# Patient Record
Sex: MALE | Race: WHITE | NOT HISPANIC OR LATINO | URBAN - METROPOLITAN AREA
[De-identification: names, ages, dates, MRNs, and addresses within clinical notes are randomized per-mention and may not be internally consistent; named-entity substitution may affect disease eponyms.]

---

## 2017-06-22 ENCOUNTER — OUTPATIENT (OUTPATIENT)
Dept: OUTPATIENT SERVICES | Facility: HOSPITAL | Age: 82
LOS: 1 days | Discharge: HOME | End: 2017-06-22

## 2017-06-22 DIAGNOSIS — E11.9 TYPE 2 DIABETES MELLITUS WITHOUT COMPLICATIONS: ICD-10-CM

## 2017-06-28 DIAGNOSIS — I10 ESSENTIAL (PRIMARY) HYPERTENSION: ICD-10-CM

## 2017-06-28 DIAGNOSIS — E78.00 PURE HYPERCHOLESTEROLEMIA, UNSPECIFIED: ICD-10-CM

## 2017-06-28 DIAGNOSIS — Z00.00 ENCOUNTER FOR GENERAL ADULT MEDICAL EXAMINATION WITHOUT ABNORMAL FINDINGS: ICD-10-CM

## 2018-11-02 ENCOUNTER — OUTPATIENT (OUTPATIENT)
Dept: OUTPATIENT SERVICES | Facility: HOSPITAL | Age: 83
LOS: 1 days | Discharge: HOME | End: 2018-11-02

## 2018-11-02 DIAGNOSIS — E78.00 PURE HYPERCHOLESTEROLEMIA, UNSPECIFIED: ICD-10-CM

## 2018-11-02 DIAGNOSIS — I10 ESSENTIAL (PRIMARY) HYPERTENSION: ICD-10-CM

## 2018-11-02 DIAGNOSIS — Z00.00 ENCOUNTER FOR GENERAL ADULT MEDICAL EXAMINATION WITHOUT ABNORMAL FINDINGS: ICD-10-CM

## 2019-02-27 ENCOUNTER — OUTPATIENT (OUTPATIENT)
Dept: OUTPATIENT SERVICES | Facility: HOSPITAL | Age: 84
LOS: 1 days | Discharge: HOME | End: 2019-02-27

## 2019-02-27 DIAGNOSIS — F03.90 UNSPECIFIED DEMENTIA WITHOUT BEHAVIORAL DISTURBANCE: ICD-10-CM

## 2019-02-27 DIAGNOSIS — I10 ESSENTIAL (PRIMARY) HYPERTENSION: ICD-10-CM

## 2019-02-27 DIAGNOSIS — Z00.00 ENCOUNTER FOR GENERAL ADULT MEDICAL EXAMINATION WITHOUT ABNORMAL FINDINGS: ICD-10-CM

## 2019-02-27 DIAGNOSIS — E78.00 PURE HYPERCHOLESTEROLEMIA, UNSPECIFIED: ICD-10-CM

## 2019-08-07 ENCOUNTER — OUTPATIENT (OUTPATIENT)
Dept: OUTPATIENT SERVICES | Facility: HOSPITAL | Age: 84
LOS: 1 days | Discharge: HOME | End: 2019-08-07

## 2019-08-08 DIAGNOSIS — R79.9 ABNORMAL FINDING OF BLOOD CHEMISTRY, UNSPECIFIED: ICD-10-CM

## 2019-08-09 ENCOUNTER — OUTPATIENT (OUTPATIENT)
Dept: OUTPATIENT SERVICES | Facility: HOSPITAL | Age: 84
LOS: 1 days | Discharge: HOME | End: 2019-08-09

## 2019-08-09 DIAGNOSIS — D64.9 ANEMIA, UNSPECIFIED: ICD-10-CM

## 2019-08-21 ENCOUNTER — OUTPATIENT (OUTPATIENT)
Dept: OUTPATIENT SERVICES | Facility: HOSPITAL | Age: 84
LOS: 1 days | Discharge: HOME | End: 2019-08-21

## 2019-08-21 DIAGNOSIS — R79.9 ABNORMAL FINDING OF BLOOD CHEMISTRY, UNSPECIFIED: ICD-10-CM

## 2019-10-21 ENCOUNTER — OUTPATIENT (OUTPATIENT)
Dept: OUTPATIENT SERVICES | Facility: HOSPITAL | Age: 84
LOS: 1 days | Discharge: HOME | End: 2019-10-21

## 2019-10-21 DIAGNOSIS — K76.9 LIVER DISEASE, UNSPECIFIED: ICD-10-CM

## 2019-10-25 ENCOUNTER — OUTPATIENT (OUTPATIENT)
Dept: OUTPATIENT SERVICES | Facility: HOSPITAL | Age: 84
LOS: 1 days | Discharge: HOME | End: 2019-10-25

## 2019-10-25 DIAGNOSIS — E11.9 TYPE 2 DIABETES MELLITUS WITHOUT COMPLICATIONS: ICD-10-CM

## 2019-10-25 DIAGNOSIS — R94.6 ABNORMAL RESULTS OF THYROID FUNCTION STUDIES: ICD-10-CM

## 2020-06-08 ENCOUNTER — INPATIENT (INPATIENT)
Facility: HOSPITAL | Age: 85
LOS: 5 days | Discharge: NURSING, REHAB OR LONG-TERM CARE | End: 2020-06-14
Attending: INTERNAL MEDICINE | Admitting: INTERNAL MEDICINE
Payer: MEDICARE

## 2020-06-08 VITALS
DIASTOLIC BLOOD PRESSURE: 77 MMHG | HEART RATE: 86 BPM | RESPIRATION RATE: 18 BRPM | TEMPERATURE: 97 F | OXYGEN SATURATION: 98 % | SYSTOLIC BLOOD PRESSURE: 121 MMHG

## 2020-06-08 LAB
ALBUMIN SERPL ELPH-MCNC: 4.4 G/DL — SIGNIFICANT CHANGE UP (ref 3.5–5.2)
ALP SERPL-CCNC: 132 U/L — HIGH (ref 30–115)
ALT FLD-CCNC: 19 U/L — SIGNIFICANT CHANGE UP (ref 0–41)
ANION GAP SERPL CALC-SCNC: 14 MMOL/L — SIGNIFICANT CHANGE UP (ref 7–14)
APTT BLD: 26.5 SEC — LOW (ref 27–39.2)
AST SERPL-CCNC: 27 U/L — SIGNIFICANT CHANGE UP (ref 0–41)
BASOPHILS # BLD AUTO: 0.03 K/UL — SIGNIFICANT CHANGE UP (ref 0–0.2)
BASOPHILS NFR BLD AUTO: 0.4 % — SIGNIFICANT CHANGE UP (ref 0–1)
BILIRUB SERPL-MCNC: <0.2 MG/DL — SIGNIFICANT CHANGE UP (ref 0.2–1.2)
BLD GP AB SCN SERPL QL: SIGNIFICANT CHANGE UP
BUN SERPL-MCNC: 33 MG/DL — HIGH (ref 10–20)
CALCIUM SERPL-MCNC: 9.7 MG/DL — SIGNIFICANT CHANGE UP (ref 8.5–10.1)
CHLORIDE SERPL-SCNC: 103 MMOL/L — SIGNIFICANT CHANGE UP (ref 98–110)
CO2 SERPL-SCNC: 23 MMOL/L — SIGNIFICANT CHANGE UP (ref 17–32)
CREAT SERPL-MCNC: 1.3 MG/DL — SIGNIFICANT CHANGE UP (ref 0.7–1.5)
EOSINOPHIL # BLD AUTO: 0.11 K/UL — SIGNIFICANT CHANGE UP (ref 0–0.7)
EOSINOPHIL NFR BLD AUTO: 1.6 % — SIGNIFICANT CHANGE UP (ref 0–8)
GLUCOSE SERPL-MCNC: 153 MG/DL — HIGH (ref 70–99)
HCT VFR BLD CALC: 38.2 % — LOW (ref 42–52)
HGB BLD-MCNC: 11.5 G/DL — LOW (ref 14–18)
IMM GRANULOCYTES NFR BLD AUTO: 0.4 % — HIGH (ref 0.1–0.3)
INR BLD: 0.93 RATIO — SIGNIFICANT CHANGE UP (ref 0.65–1.3)
LYMPHOCYTES # BLD AUTO: 1.58 K/UL — SIGNIFICANT CHANGE UP (ref 1.2–3.4)
LYMPHOCYTES # BLD AUTO: 22.7 % — SIGNIFICANT CHANGE UP (ref 20.5–51.1)
MCHC RBC-ENTMCNC: 21.1 PG — LOW (ref 27–31)
MCHC RBC-ENTMCNC: 30.1 G/DL — LOW (ref 32–37)
MCV RBC AUTO: 70 FL — LOW (ref 80–94)
MONOCYTES # BLD AUTO: 0.49 K/UL — SIGNIFICANT CHANGE UP (ref 0.1–0.6)
MONOCYTES NFR BLD AUTO: 7 % — SIGNIFICANT CHANGE UP (ref 1.7–9.3)
NEUTROPHILS # BLD AUTO: 4.72 K/UL — SIGNIFICANT CHANGE UP (ref 1.4–6.5)
NEUTROPHILS NFR BLD AUTO: 67.9 % — SIGNIFICANT CHANGE UP (ref 42.2–75.2)
NRBC # BLD: 0 /100 WBCS — SIGNIFICANT CHANGE UP (ref 0–0)
PLATELET # BLD AUTO: 302 K/UL — SIGNIFICANT CHANGE UP (ref 130–400)
POTASSIUM SERPL-MCNC: 6.1 MMOL/L — CRITICAL HIGH (ref 3.5–5)
POTASSIUM SERPL-SCNC: 6.1 MMOL/L — CRITICAL HIGH (ref 3.5–5)
PROT SERPL-MCNC: 7.2 G/DL — SIGNIFICANT CHANGE UP (ref 6–8)
PROTHROM AB SERPL-ACNC: 10.7 SEC — SIGNIFICANT CHANGE UP (ref 9.95–12.87)
RBC # BLD: 5.46 M/UL — SIGNIFICANT CHANGE UP (ref 4.7–6.1)
RBC # FLD: 20.9 % — HIGH (ref 11.5–14.5)
SODIUM SERPL-SCNC: 140 MMOL/L — SIGNIFICANT CHANGE UP (ref 135–146)
WBC # BLD: 6.96 K/UL — SIGNIFICANT CHANGE UP (ref 4.8–10.8)
WBC # FLD AUTO: 6.96 K/UL — SIGNIFICANT CHANGE UP (ref 4.8–10.8)

## 2020-06-08 PROCEDURE — 99285 EMERGENCY DEPT VISIT HI MDM: CPT

## 2020-06-08 PROCEDURE — 73502 X-RAY EXAM HIP UNI 2-3 VIEWS: CPT | Mod: 26,RT

## 2020-06-08 PROCEDURE — 70450 CT HEAD/BRAIN W/O DYE: CPT | Mod: 26

## 2020-06-08 PROCEDURE — 99222 1ST HOSP IP/OBS MODERATE 55: CPT | Mod: AI

## 2020-06-08 PROCEDURE — 72125 CT NECK SPINE W/O DYE: CPT | Mod: 26

## 2020-06-08 PROCEDURE — 93010 ELECTROCARDIOGRAM REPORT: CPT

## 2020-06-08 PROCEDURE — 71045 X-RAY EXAM CHEST 1 VIEW: CPT | Mod: 26

## 2020-06-08 RX ORDER — LISINOPRIL 2.5 MG/1
1 TABLET ORAL
Qty: 0 | Refills: 0 | DISCHARGE

## 2020-06-08 RX ORDER — CITALOPRAM 10 MG/1
20 TABLET, FILM COATED ORAL DAILY
Refills: 0 | Status: DISCONTINUED | OUTPATIENT
Start: 2020-06-08 | End: 2020-06-09

## 2020-06-08 RX ORDER — DONEPEZIL HYDROCHLORIDE 10 MG/1
1 TABLET, FILM COATED ORAL
Qty: 0 | Refills: 0 | DISCHARGE

## 2020-06-08 RX ORDER — LEVOTHYROXINE SODIUM 125 MCG
50 TABLET ORAL DAILY
Refills: 0 | Status: DISCONTINUED | OUTPATIENT
Start: 2020-06-08 | End: 2020-06-09

## 2020-06-08 RX ORDER — ATORVASTATIN CALCIUM 80 MG/1
1 TABLET, FILM COATED ORAL
Qty: 0 | Refills: 0 | DISCHARGE

## 2020-06-08 RX ORDER — LEVOTHYROXINE SODIUM 125 MCG
1 TABLET ORAL
Qty: 0 | Refills: 0 | DISCHARGE

## 2020-06-08 RX ORDER — DONEPEZIL HYDROCHLORIDE 10 MG/1
10 TABLET, FILM COATED ORAL AT BEDTIME
Refills: 0 | Status: DISCONTINUED | OUTPATIENT
Start: 2020-06-08 | End: 2020-06-09

## 2020-06-08 RX ORDER — CITALOPRAM 10 MG/1
1 TABLET, FILM COATED ORAL
Qty: 0 | Refills: 0 | DISCHARGE

## 2020-06-08 RX ORDER — ACETAMINOPHEN 500 MG
650 TABLET ORAL ONCE
Refills: 0 | Status: COMPLETED | OUTPATIENT
Start: 2020-06-08 | End: 2020-06-08

## 2020-06-08 RX ORDER — HEPARIN SODIUM 5000 [USP'U]/ML
5000 INJECTION INTRAVENOUS; SUBCUTANEOUS EVERY 8 HOURS
Refills: 0 | Status: DISCONTINUED | OUTPATIENT
Start: 2020-06-08 | End: 2020-06-09

## 2020-06-08 RX ORDER — SODIUM CHLORIDE 9 MG/ML
1000 INJECTION, SOLUTION INTRAVENOUS
Refills: 0 | Status: DISCONTINUED | OUTPATIENT
Start: 2020-06-08 | End: 2020-06-09

## 2020-06-08 RX ORDER — MORPHINE SULFATE 50 MG/1
2 CAPSULE, EXTENDED RELEASE ORAL EVERY 4 HOURS
Refills: 0 | Status: DISCONTINUED | OUTPATIENT
Start: 2020-06-08 | End: 2020-06-09

## 2020-06-08 RX ORDER — CLONAZEPAM 1 MG
0.5 TABLET ORAL AT BEDTIME
Refills: 0 | Status: DISCONTINUED | OUTPATIENT
Start: 2020-06-08 | End: 2020-06-09

## 2020-06-08 RX ORDER — MORPHINE SULFATE 50 MG/1
2 CAPSULE, EXTENDED RELEASE ORAL ONCE
Refills: 0 | Status: DISCONTINUED | OUTPATIENT
Start: 2020-06-08 | End: 2020-06-08

## 2020-06-08 RX ORDER — MIDAZOLAM HYDROCHLORIDE 1 MG/ML
2 INJECTION, SOLUTION INTRAMUSCULAR; INTRAVENOUS ONCE
Refills: 0 | Status: DISCONTINUED | OUTPATIENT
Start: 2020-06-08 | End: 2020-06-08

## 2020-06-08 RX ORDER — ATORVASTATIN CALCIUM 80 MG/1
40 TABLET, FILM COATED ORAL DAILY
Refills: 0 | Status: DISCONTINUED | OUTPATIENT
Start: 2020-06-08 | End: 2020-06-09

## 2020-06-08 RX ADMIN — MORPHINE SULFATE 2 MILLIGRAM(S): 50 CAPSULE, EXTENDED RELEASE ORAL at 18:29

## 2020-06-08 RX ADMIN — MIDAZOLAM HYDROCHLORIDE 2 MILLIGRAM(S): 1 INJECTION, SOLUTION INTRAMUSCULAR; INTRAVENOUS at 20:09

## 2020-06-08 NOTE — ED ADULT NURSE NOTE - OBJECTIVE STATEMENT
Pt presented to the ED s/p fall from [facility]. Pt does not recall event and is poor historian.

## 2020-06-08 NOTE — H&P ADULT - NSHPLABSRESULTS_GEN_ALL_CORE
CBC Full  -  ( 08 Jun 2020 18:21 )  WBC Count : 6.96 K/uL  RBC Count : 5.46 M/uL  Hemoglobin : 11.5 g/dL  Hematocrit : 38.2 %  Platelet Count - Automated : 302 K/uL  Mean Cell Volume : 70.0 fL  Mean Cell Hemoglobin : 21.1 pg  Mean Cell Hemoglobin Concentration : 30.1 g/dL  Auto Neutrophil # : 4.72 K/uL  Auto Lymphocyte # : 1.58 K/uL  Auto Monocyte # : 0.49 K/uL  Auto Eosinophil # : 0.11 K/uL  Auto Basophil # : 0.03 K/uL  Auto Neutrophil % : 67.9 %  Auto Lymphocyte % : 22.7 %  Auto Monocyte % : 7.0 %  Auto Eosinophil % : 1.6 %  Auto Basophil % : 0.4 %    06-08    140  |  103  |  33<H>  ----------------------------<  153<H>  6.1<HH>   |  23  |  1.3    Ca    9.7      08 Jun 2020 18:21    TPro  7.2  /  Alb  4.4  /  TBili  <0.2  /  DBili  x   /  AST  27  /  ALT  19  /  AlkPhos  132<H>  06-08

## 2020-06-08 NOTE — ED PROVIDER NOTE - CLINICAL SUMMARY MEDICAL DECISION MAKING FREE TEXT BOX
Elderly male normally ambulatory presents to ER for fall in NH today. Landed on right side. Has +right hip fx, intertrochanteric, mild displacement. Neuro-vascular intact. No other injury. Admitted to medicine, with ortho consult. Labs, ekg, xrays and CT scans reviewed

## 2020-06-08 NOTE — ED PROVIDER NOTE - ATTENDING CONTRIBUTION TO CARE
85 yo male with PMH of anemia, sepsis, CKD, HTN, HLD, DM, alzheimer's dementia, bilateral cataract, hypothyroidism, aggressive behavior presents to ER from [facility] for a fall. Per EMS, pt found down in room by staff, landing on right side and complained about pain the right hip. Pt in ER states he fell but feels fine. Denies any other complaints. Pt on exam is in NAD, vitals stable, NCAT, EOMI, PERRL, no c-spine tenderness, no spinal tenderness, no rib tenderness Lungs CTAB, heart regular s1s2, Abdomen soft nt/nd +BS, no masses, no bruising, Ext mild shortening of RLE, +right hip tenderness to palpation, no tenderness or joint deformity elsewhere, distal pulses intact, Neuro alert, oriented to name at present, no slurred speech, no motor or sensory deficit. Check xrays, labs, ekg, CT scans, and if +fx, to admit.     ALL: nkda  PMH as above  Meds: atorvastatin, citalopram, clonazepam, donepezil, lisinopril, synthroid, tylenol  PMD Pelora/Seminara

## 2020-06-08 NOTE — ED ADULT NURSE NOTE - NSFALLRSKASSESSTYPE_ED_ALL_ED
[FreeTextEntry6] : PROCEDURE NOTES\par \par PROCEDURE: Nasal endoscopy \par SURGEON: Dr. Rollins\par INDICATIONS: Assess for chronic sinusitis. \par ANESTHESIA: The patient was placed in a sitting position.  Following application of the topical anesthetic and decongestant, exam was performed with a zero degree endoscope.  The scope was passed along the right nasal floor to the nasopharynx.  It was then passed into the region of the middle meatus, middle turbinate, and sphenoethmoid region.  An identical procedure was performed on the left side.  The following findings were noted:\par \par The nasal mucosa was healthy appearing and the septum was roughly midline. The middle meatus and sphenoethmoid recesses were clear bilaterally. The nasopharynx was normal. \par  
Initial (On Arrival)

## 2020-06-08 NOTE — H&P ADULT - ASSESSMENT
pt is a 86 years old m with PMH  of advanced  alzheimer , mood disorder , HTN , presented to ED  after  being found at nursing home on Right  side on ground this evening. Circumstances of fall are unclear per NH , no downtime known. Pt known to be ambulatory at baseline. Pt only complaining of pain to Right  hip pain. On presentation to ED pt found to have right intertrochanteric fracture . Pt evaluated by orthopedics . Pt will go for ORIF tomorrow .    #) Right Intertrochanteric fracture  - admit to medicine  - NWB right leg  - pain control  - IVF for now  - Keep pt NPO past midnight  - Hold 2 units of PRBC  - Pt hyperkalemic on lab but hemolyzed will repeat  - COVID swab  - Medical Clearance    #) Advanced Alzheimers  - continue donepezil , clonazepam and citalopram    #) HTN  - continue lisinopril    #)Hypothyroidism  - continue synthyroid      #) Diet NPO past midnight , IVF     #) DVT PPX heparin sq    Dispo Acute

## 2020-06-08 NOTE — H&P ADULT - ATTENDING COMMENTS
HPI:  pt is a 86 years old m with PMH  of advanced  alzheimer , mood disorder , HTN , presented to ED  after  being found at nursing home on Right  side on ground this evening. Circumstances of fall are unclear per NH , no downtime known. Pt known to be ambulatory at baseline. Pt only complaining of pain to Right  hip pain. On presentation to ED pt found to have right intertrochanteric fracture . Pt evaluated by orthopedics . Pt will go for ORIF tomorrow . (08 Jun 2020 22:19)    REVIEW OF SYSTEMS: see cc/HPI   CONSTITUTIONAL: No weakness, fevers or chills  EYES/ENT: No visual changes;  No vertigo or throat pain   NECK: No pain or stiffness  RESPIRATORY: No cough, wheezing, hemoptysis; No shortness of breath  CARDIOVASCULAR: No chest pain or palpitations  GASTROINTESTINAL: No abdominal or epigastric pain. No nausea, vomiting, or hematemesis; No diarrhea or constipation. No melena or hematochezia.  GENITOURINARY: No dysuria, frequency or hematuria  NEUROLOGICAL: No numbness or weakness  SKIN: No itching, rashes    Unable to gauge functional status in patient     < from: 12 Lead ECG (06.08.20 @ 19:56) >  Ventricular Rate 92 BPM  Atrial Rate 92 BPM  P-R Interval 144 ms  QRS Duration 84 ms  Q-T Interval 356 ms  QTC Calculation(Bezet) 440 ms  P Axis 79 degrees  R Axis -36 degrees  T Axis 63 degrees  Diagnosis Line Sinus rhythm with occasional Premature ventricular complexes  Left axis deviation  Left anterior fascicular block  Abnormal ECG  Confirmed by FADI DAVEY MD ([id number]) on 6/8/2020 10:52:39 PM  < end of copied text >        Physical Exam:  General: WN/WD NAD, w/o complaint   Neurology: A&Ox3, nonfocal, follows commands  Eyes: PERRLA/ EOMI  ENT/Neck: Neck supple, trachea midline, No JVD  Respiratory: CTA B/L, No wheezing, rales, rhonchi  CV: Normal rate regular rhythm, S1S2, no murmurs, rubs or gallops  Abdominal: Soft, NT, ND +BS,   Extremities: No edema, + peripheral pulses, R LE externally rotated and shortened   Skin: No Rashes, Hematoma, Ecchymosis  Incisions:   Tubes:    A/p   R hip fracture in patient w/ limited mobility and dementia at baseline  -NPO IV fluids after MN  -PRN Pain Rx  -Repeat BMP ( need to check K+) prior to OR   -f/U COVID-19 PCR prior to OR   -Ortho follow up   -will confirm w/ family surgical plan and answer questions     Dementia - Alzheimer's disease   -c/w current Rx    HTN -stable    DVT prophylaxis

## 2020-06-08 NOTE — ED PROVIDER NOTE - OBJECTIVE STATEMENT
pt is a 86 yom w/ jarrett here for being found at nursing home on R side on ground this evening. pt known to be ambulatory at baseline. Pt only complaining of pain to R hip.

## 2020-06-08 NOTE — ED PROVIDER NOTE - PROGRESS NOTE DETAILS
Franck: called [facility] , and spoke to ERAN Carroll. Discussed case and need for a consult Franck--spoke to Nurse supervisor (shayy) at [facility], informed her we will admit patient to hospital given acute hip fracture. She also gave me the daughters contact number which is Jacqueline Lisa [phone]. CP: spoke with daughter, informed her of pt status and admission. Informed daughter that the Ortho service will be calling her with patient's options.

## 2020-06-08 NOTE — CONSULT NOTE ADULT - SUBJECTIVE AND OBJECTIVE BOX
86 y o M transferred from [facility] to hospital with right hip IT fx s/p fall today. Presents with right hip pain.    PAST MEDICAL & SURGICAL HISTORY:  CKD  HTN  DLD  DM        Allergies    No Known Allergies    Intolerances    Pt s/e in ER    NAD    Vital Signs Last 24 Hrs  T(C): 37.1 (08 Jun 2020 19:41), Max: 37.1 (08 Jun 2020 19:41)  T(F): 98.7 (08 Jun 2020 19:41), Max: 98.7 (08 Jun 2020 19:41)  HR: 94 (08 Jun 2020 19:41) (86 - 94)  BP: 115/52 (08 Jun 2020 19:41) (115/52 - 121/77)  BP(mean): --  RR: 18 (08 Jun 2020 19:41) (18 - 18)  SpO2: 97% (08 Jun 2020 19:41) (97% - 98%)    PE: RLE shortened, externally rotated, right groin ttp, pain on leg rolling, ankle/toes- motor function intact, sensation grossly intact, foot wwp, palpable pulses                          11.5   6.96  )-----------( 302      ( 08 Jun 2020 18:21 )             38.2     06-08    140  |  103  |  33<H>  ----------------------------<  153<H>  6.1<HH>   |  23  |  1.3    Ca    9.7      08 Jun 2020 18:21    TPro  7.2  /  Alb  4.4  /  TBili  <0.2  /  DBili  x   /  AST  27  /  ALT  19  /  AlkPhos  132<H>  06-08      PT/INR - ( 08 Jun 2020 19:16 )   PT: 10.70 sec;   INR: 0.93 ratio         PTT - ( 08 Jun 2020 19:16 )  PTT:26.5 sec      < from: Xray Hip 2-3 Views, Right (06.08.20 @ 18:50) >  Findings/impression    Right hip intertrochanteric fracture, acute.    < end of copied text >

## 2020-06-08 NOTE — CONSULT NOTE ADULT - ATTENDING COMMENTS
Pt. seen/ examined  Oriented X1, self  Not aware of fracture  No additional injuries noted  RLE NVID  Will obtain consent from family  Planning R femur IMN once medically optimized/ cleared/ OR available

## 2020-06-08 NOTE — CONSULT NOTE ADULT - ASSESSMENT
86 y o M with right intertrochanteric fx s/p fall    -medicine evaluation/optimization for possible surgery tomorrow- right hip ORIF, on add on schedule  - NPO after MN  - bedrest, NWB RLE  - pain control  - DVT ppx  - repeat labs in am  -2 units PRBC on hold for OR  - w/d with dr Diez

## 2020-06-08 NOTE — ED ADULT NURSE NOTE - NSIMPLEMENTINTERV_GEN_ALL_ED
Implemented All Fall with Harm Risk Interventions:  [place] to call system. Call bell, personal items and telephone within reach. Instruct patient to call for assistance. Room bathroom lighting operational. Non-slip footwear when patient is off stretcher. Physically safe environment: no spills, clutter or unnecessary equipment. Stretcher in lowest position, wheels locked, appropriate side rails in place. Provide visual cue, wrist band, yellow gown, etc. Monitor gait and stability. Monitor for mental status changes and reorient to person, place, and time. Review medications for side effects contributing to fall risk. Reinforce activity limits and safety measures with patient and family. Provide visual clues: red socks.

## 2020-06-08 NOTE — H&P ADULT - NSHPPHYSICALEXAM_GEN_ALL_CORE
PHYSICAL EXAM:  GENERAL: NAD, well-developed  HEAD:  Atraumatic, Normocephalic  EYES: EOMI, PERRLA, conjunctiva and sclera clear  NECK: Supple, No JVD  CHEST/LUNG: Clear to auscultation bilaterally; No wheeze  HEART: Regular rate and rhythm; No murmurs, rubs, or gallops  ABDOMEN: Soft, Nontender, Nondistended; Bowel sounds present  EXTREMITIES:  Right hip pain

## 2020-06-08 NOTE — ED PROVIDER NOTE - PHYSICAL EXAMINATION
Constitutional: Well developed, well nourished. NAD  TRAUMA: ABC intact. GCS 15.   Head: Normocephalic, atraumatic.  Eyes: PERRL. EOMI. No Raccoon eyes.   ENT: No nasal discharge. No septal hematoma. No Aguilar sign. Mucous membranes moist.  Neck: Supple. Painless ROM. No midline tenderness, stepoffs.  Cardiovascular: Normal S1, S2. Regular rate and rhythm. No murmurs, rubs, or gallops.  Pulmonary: Normal respiratory rate and effort. Lungs clear to auscultation bilaterally. No wheezing, rales, or rhonchi.  CHEST: No chest wall tenderness, crepitus.  Abdominal: Soft. Nondistended. Nontender. No rebound, guarding, rigidity.  BACK: No midline T/L/S tenderness, stepoffs. No saddle paresthesia.  Extremities. Pelvis stable. No traumatic deformities. +tenderness to R hip  Skin: No rashes, cyanosis, lacerations, abrasions.  Neuro: AAOx3. Strength 5/5 in all extremities. Sensation intact throughout. No focal neurological deficits.  Psych: Normal mood. Normal affect.

## 2020-06-08 NOTE — H&P ADULT - HISTORY OF PRESENT ILLNESS
pt is a 86 years old m with PMH  of advanced  alzheimer , mood disorder , HTN , presented to ED  after  being found at nursing home on Right  side on ground this evening. Circumstances of fall are unclear per NH , no downtime known. Pt known to be ambulatory at baseline. Pt only complaining of pain to Right  hip pain. On presentation to ED pt found to have right intertrochanteric fracture . Pt evaluated by orthopedics . Pt will go for ORIF tomorrow .

## 2020-06-09 LAB
ABO RH CONFIRMATION: SIGNIFICANT CHANGE UP
ANION GAP SERPL CALC-SCNC: 11 MMOL/L — SIGNIFICANT CHANGE UP (ref 7–14)
ANION GAP SERPL CALC-SCNC: 12 MMOL/L — SIGNIFICANT CHANGE UP (ref 7–14)
BUN SERPL-MCNC: 33 MG/DL — HIGH (ref 10–20)
BUN SERPL-MCNC: 35 MG/DL — HIGH (ref 10–20)
CALCIUM SERPL-MCNC: 9.2 MG/DL — SIGNIFICANT CHANGE UP (ref 8.5–10.1)
CALCIUM SERPL-MCNC: 9.3 MG/DL — SIGNIFICANT CHANGE UP (ref 8.5–10.1)
CHLORIDE SERPL-SCNC: 104 MMOL/L — SIGNIFICANT CHANGE UP (ref 98–110)
CHLORIDE SERPL-SCNC: 105 MMOL/L — SIGNIFICANT CHANGE UP (ref 98–110)
CK SERPL-CCNC: 73 U/L — SIGNIFICANT CHANGE UP (ref 0–225)
CO2 SERPL-SCNC: 22 MMOL/L — SIGNIFICANT CHANGE UP (ref 17–32)
CO2 SERPL-SCNC: 24 MMOL/L — SIGNIFICANT CHANGE UP (ref 17–32)
CREAT SERPL-MCNC: 1.3 MG/DL — SIGNIFICANT CHANGE UP (ref 0.7–1.5)
CREAT SERPL-MCNC: 1.3 MG/DL — SIGNIFICANT CHANGE UP (ref 0.7–1.5)
GLUCOSE BLDC GLUCOMTR-MCNC: 104 MG/DL — HIGH (ref 70–99)
GLUCOSE BLDC GLUCOMTR-MCNC: 109 MG/DL — HIGH (ref 70–99)
GLUCOSE BLDC GLUCOMTR-MCNC: 117 MG/DL — HIGH (ref 70–99)
GLUCOSE BLDC GLUCOMTR-MCNC: 240 MG/DL — HIGH (ref 70–99)
GLUCOSE BLDC GLUCOMTR-MCNC: 290 MG/DL — HIGH (ref 70–99)
GLUCOSE BLDC GLUCOMTR-MCNC: 62 MG/DL — LOW (ref 70–99)
GLUCOSE SERPL-MCNC: 185 MG/DL — HIGH (ref 70–99)
GLUCOSE SERPL-MCNC: 190 MG/DL — HIGH (ref 70–99)
HCT VFR BLD CALC: 29.2 % — LOW (ref 42–52)
HGB BLD-MCNC: 8.7 G/DL — LOW (ref 14–18)
MCHC RBC-ENTMCNC: 20.4 PG — LOW (ref 27–31)
MCHC RBC-ENTMCNC: 29.8 G/DL — LOW (ref 32–37)
MCV RBC AUTO: 68.5 FL — LOW (ref 80–94)
NRBC # BLD: 0 /100 WBCS — SIGNIFICANT CHANGE UP (ref 0–0)
PLATELET # BLD AUTO: 272 K/UL — SIGNIFICANT CHANGE UP (ref 130–400)
POTASSIUM SERPL-MCNC: 5 MMOL/L — SIGNIFICANT CHANGE UP (ref 3.5–5)
POTASSIUM SERPL-MCNC: 5.3 MMOL/L — HIGH (ref 3.5–5)
POTASSIUM SERPL-SCNC: 5 MMOL/L — SIGNIFICANT CHANGE UP (ref 3.5–5)
POTASSIUM SERPL-SCNC: 5.3 MMOL/L — HIGH (ref 3.5–5)
RBC # BLD: 4.26 M/UL — LOW (ref 4.7–6.1)
RBC # FLD: 19.5 % — HIGH (ref 11.5–14.5)
SARS-COV-2 RNA SPEC QL NAA+PROBE: SIGNIFICANT CHANGE UP
SODIUM SERPL-SCNC: 139 MMOL/L — SIGNIFICANT CHANGE UP (ref 135–146)
SODIUM SERPL-SCNC: 139 MMOL/L — SIGNIFICANT CHANGE UP (ref 135–146)
TROPONIN T SERPL-MCNC: <0.01 NG/ML — SIGNIFICANT CHANGE UP
WBC # BLD: 9.19 K/UL — SIGNIFICANT CHANGE UP (ref 4.8–10.8)
WBC # FLD AUTO: 9.19 K/UL — SIGNIFICANT CHANGE UP (ref 4.8–10.8)

## 2020-06-09 PROCEDURE — 99223 1ST HOSP IP/OBS HIGH 75: CPT

## 2020-06-09 PROCEDURE — 73552 X-RAY EXAM OF FEMUR 2/>: CPT | Mod: 26,RT

## 2020-06-09 RX ORDER — DEXTROSE 50 % IN WATER 50 %
15 SYRINGE (ML) INTRAVENOUS ONCE
Refills: 0 | Status: DISCONTINUED | OUTPATIENT
Start: 2020-06-09 | End: 2020-06-09

## 2020-06-09 RX ORDER — DEXTROSE 50 % IN WATER 50 %
12.5 SYRINGE (ML) INTRAVENOUS ONCE
Refills: 0 | Status: DISCONTINUED | OUTPATIENT
Start: 2020-06-09 | End: 2020-06-09

## 2020-06-09 RX ORDER — DEXTROSE 10 % IN WATER 10 %
250 INTRAVENOUS SOLUTION INTRAVENOUS ONCE
Refills: 0 | Status: COMPLETED | OUTPATIENT
Start: 2020-06-09 | End: 2020-06-09

## 2020-06-09 RX ORDER — DEXTROSE 50 % IN WATER 50 %
25 SYRINGE (ML) INTRAVENOUS ONCE
Refills: 0 | Status: DISCONTINUED | OUTPATIENT
Start: 2020-06-09 | End: 2020-06-09

## 2020-06-09 RX ORDER — SODIUM CHLORIDE 9 MG/ML
1000 INJECTION, SOLUTION INTRAVENOUS
Refills: 0 | Status: DISCONTINUED | OUTPATIENT
Start: 2020-06-09 | End: 2020-06-09

## 2020-06-09 RX ORDER — INSULIN LISPRO 100/ML
VIAL (ML) SUBCUTANEOUS
Refills: 0 | Status: DISCONTINUED | OUTPATIENT
Start: 2020-06-09 | End: 2020-06-09

## 2020-06-09 RX ORDER — GLUCAGON INJECTION, SOLUTION 0.5 MG/.1ML
1 INJECTION, SOLUTION SUBCUTANEOUS ONCE
Refills: 0 | Status: DISCONTINUED | OUTPATIENT
Start: 2020-06-09 | End: 2020-06-09

## 2020-06-09 RX ORDER — INSULIN HUMAN 100 [IU]/ML
10 INJECTION, SOLUTION SUBCUTANEOUS ONCE
Refills: 0 | Status: COMPLETED | OUTPATIENT
Start: 2020-06-09 | End: 2020-06-09

## 2020-06-09 RX ADMIN — SODIUM CHLORIDE 75 MILLILITER(S): 9 INJECTION, SOLUTION INTRAVENOUS at 00:07

## 2020-06-09 RX ADMIN — INSULIN HUMAN 10 UNIT(S): 100 INJECTION, SOLUTION SUBCUTANEOUS at 06:57

## 2020-06-09 RX ADMIN — CITALOPRAM 20 MILLIGRAM(S): 10 TABLET, FILM COATED ORAL at 12:04

## 2020-06-09 RX ADMIN — HEPARIN SODIUM 5000 UNIT(S): 5000 INJECTION INTRAVENOUS; SUBCUTANEOUS at 16:29

## 2020-06-09 RX ADMIN — MORPHINE SULFATE 2 MILLIGRAM(S): 50 CAPSULE, EXTENDED RELEASE ORAL at 03:30

## 2020-06-09 RX ADMIN — Medication 50 MICROGRAM(S): at 05:06

## 2020-06-09 RX ADMIN — Medication 750 MILLILITER(S): at 11:55

## 2020-06-09 RX ADMIN — HEPARIN SODIUM 5000 UNIT(S): 5000 INJECTION INTRAVENOUS; SUBCUTANEOUS at 05:06

## 2020-06-09 RX ADMIN — Medication 1000 MILLILITER(S): at 06:57

## 2020-06-09 NOTE — CHART NOTE - NSCHARTNOTEFT_GEN_A_CORE
PACU ANESTHESIA ADMISSION NOTE      Procedure: RIGHT HIP ORIF  Post op diagnosis:  RIGHT HIP FX    ____  Intubated  TV:______       Rate: ______      FiO2: ______    _X___  Patent Airway    __X__  Full return of protective reflexes    ____X  Full recovery from anesthesia / back to baseline status    Vitals:  T(C): 38.7   HR: 129  BP: 159/76   RR: 20  SpO2: 95%    Mental Status:  _X___ Awake   _____ Alert   _____ Drowsy   _____ Sedated    Nausea/Vomiting:  _X___ NO  ______Yes,   See Post - Op Orders          Pain Scale (0-10):  _____    Treatment: __X__ None    ____ See Post - Op/PCA Orders    Post - Operative Fluids:   ____ Oral   ___X_ See Post - Op Orders    Plan: Discharge:   ____Home       ___X__Floor     _____Critical Care    _____  Other:_________________    Comments:

## 2020-06-09 NOTE — PROGRESS NOTE ADULT - ASSESSMENT
pt is a 86 years old m with PMH  of advanced  alzheimer , mood disorder , HTN , presented to ED  after  being found at nursing home on Right  side on ground this evening. Circumstances of fall are unclear per NH , no downtime known. Pt known to be ambulatory at baseline. Pt only complaining of pain to Right  hip pain. On presentation to ED pt found to have right intertrochanteric fracture . Pt evaluated by orthopedics . Pt will go for ORIF today.    #) Right Intertrochanteric fracture pending ORIF today  - admit to medicine  - NWB right leg  - pain control  - IVF for now  - Keep pt NPO  - Hold 2 units of PRBC  - Pt hyperkalemic on lab but hemolyzed will repeat  - COVID swab f/u  - Medical Clearance    #) Advanced Alzheimers  - continue donepezil , clonazepam and citalopram    #) HTN  - continue lisinopril    #)Hypothyroidism  - continue synthyroid      #) Diet NPO, IVF     #) DVT PPX heparin sq    Dispo Acute

## 2020-06-09 NOTE — PROGRESS NOTE ADULT - SUBJECTIVE AND OBJECTIVE BOX
Pre Op: planned procedure ORIF    HPI:  pt is a 86 years old m with PMH  of advanced  alzheimer , mood disorder , HTN , presented to ED  after  being found at nursing home on Right  side on ground this evening. Circumstances of fall are unclear per NH , no downtime known. Pt known to be ambulatory at baseline. Pt only complaining of pain to Right  hip pain. On presentation to ED pt found to have right intertrochanteric fracture . Pt evaluated by orthopedics . Pt will go for ORIF today.     PAST MEDICAL & SURGICAL HISTORY:  alzhimer HTN, Mood d/o, hypothyroidism, hyperlipidemia,     allergy: NKDA    Home Medications:  atorvastatin 40 mg oral tablet: 1 tab(s) orally once a day (08 Jun 2020 22:23)  citalopram 20 mg oral tablet: 1 tab(s) orally once a day (08 Jun 2020 22:23)  clonazePAM 0.5 mg oral tablet: 1 tab(s) orally once a day (at bedtime) (08 Jun 2020 22:23)  donepezil 10 mg oral tablet: 1 tab(s) orally once a day (at bedtime) (08 Jun 2020 22:23)  lisinopril 5 mg oral tablet: 1 tab(s) orally once a day (08 Jun 2020 22:24)  Synthroid 50 mcg (0.05 mg) oral tablet: 1 tab(s) orally once a day (08 Jun 2020 22:24)      MEDICATIONS  (STANDING):  atorvastatin Oral Tab/Cap - Peds 40 milliGRAM(s) Oral daily  citalopram 20 milliGRAM(s) Oral daily  clonazePAM  Tablet 0.5 milliGRAM(s) Oral at bedtime  dextrose 10% Bolus 250 milliLiter(s) IV Bolus once  dextrose 5% + sodium chloride 0.45%. 1000 milliLiter(s) (75 mL/Hr) IV Continuous <Continuous>  dextrose 5%. 1000 milliLiter(s) (50 mL/Hr) IV Continuous <Continuous>  dextrose 50% Injectable 12.5 Gram(s) IV Push once  dextrose 50% Injectable 25 Gram(s) IV Push once  dextrose 50% Injectable 25 Gram(s) IV Push once  donepezil 10 milliGRAM(s) Oral at bedtime  heparin   Injectable 5000 Unit(s) SubCutaneous every 8 hours  insulin lispro (HumaLOG) corrective regimen sliding scale   SubCutaneous three times a day before meals  levothyroxine 50 MICROGram(s) Oral daily      pt is not in pain now.   Vital Signs Last 24 Hrs  T(C): 37.2 (09 Jun 2020 07:56), Max: 37.3 (09 Jun 2020 05:50)  T(F): 98.9 (09 Jun 2020 07:56), Max: 99.1 (09 Jun 2020 05:50)  HR: 62 (09 Jun 2020 07:56) (60 - 94)  BP: 157/67 (09 Jun 2020 07:56) (115/52 - 157/67)  RR: 18 (09 Jun 2020 07:56) (18 - 18)  SpO2: 97% (09 Jun 2020 07:56) (96% - 98%)                          8.7    9.19  )-----------( 272      ( 09 Jun 2020 05:39 )             29.2   06-09    139  |  104  |  33<H>  ----------------------------<  185<H>  5.0   |  24  |  1.3    Ca    9.2      09 Jun 2020 05:39    TPro  7.2  /  Alb  4.4  /  TBili  <0.2  /  DBili  x   /  AST  27  /  ALT  19  /  AlkPhos  132<H>  06-08    < from: 12 Lead ECG (06.08.20 @ 19:56) >  Ventricular Rate 92 BPM    Atrial Rate 92 BPM    P-R Interval 144 ms    QRS Duration 84 ms    Q-T Interval 356 ms    QTC Calculation(Bezet) 440 ms    P Axis 79 degrees    R Axis -36 degrees    T Axis 63 degrees    Diagnosis Line Sinus rhythm with occasional Premature ventricular complexes  Left axis deviation  Left anterior fascicular block  Abnormal ECG    < end of copied text >    < from: Xray Femur 2 Views, Right (06.09.20 @ 06:19) >  Acute intertrochanteric/subtrochanteric right femoral fracture    < end of copied text >    < from: Xray Chest 1 View-PORTABLE IMMEDIATE (06.08.20 @ 18:49) >  No radiographic evidence of acute cardiopulmonary disease.    < end of copied text >    a/p  #acute intratrochanteric and subtrochanteric right femoral fracuture due to mechanical fall.   good functional stautus, as per daughter pt was active and doing pushups prior to fall.     #anemia acute drop in hct,  transfuse prbc,     #HTN, bp is acceptable for surgery, cont meds, monitor post op   #dyslipidemia. cont meds  #hypothyroidism, cont meds  #mood dx, cont meds  #demenia, cont meds    pt is moderate risk for surgery, no further pre op workup is needed. will transfuse [facility]   DVT prophylaxis,   full code Pre Op: planned procedure ORIF    HPI:  pt is a 86 years old m with PMH  of advanced  alzheimer , mood disorder , HTN , presented to ED  after  being found at nursing home on Right  side on ground this evening. Circumstances of fall are unclear per NH , no downtime known. Pt known to be ambulatory at baseline. Pt only complaining of pain to Right  hip pain. On presentation to ED pt found to have right intertrochanteric fracture . Pt evaluated by orthopedics . Pt will go for ORIF today.     PAST MEDICAL & SURGICAL HISTORY:  alzhimer HTN, Mood d/o, hypothyroidism, hyperlipidemia,     allergy: NKDA    Home Medications:  atorvastatin 40 mg oral tablet: 1 tab(s) orally once a day (08 Jun 2020 22:23)  citalopram 20 mg oral tablet: 1 tab(s) orally once a day (08 Jun 2020 22:23)  clonazePAM 0.5 mg oral tablet: 1 tab(s) orally once a day (at bedtime) (08 Jun 2020 22:23)  donepezil 10 mg oral tablet: 1 tab(s) orally once a day (at bedtime) (08 Jun 2020 22:23)  lisinopril 5 mg oral tablet: 1 tab(s) orally once a day (08 Jun 2020 22:24)  Synthroid 50 mcg (0.05 mg) oral tablet: 1 tab(s) orally once a day (08 Jun 2020 22:24)      MEDICATIONS  (STANDING):  atorvastatin Oral Tab/Cap - Peds 40 milliGRAM(s) Oral daily  citalopram 20 milliGRAM(s) Oral daily  clonazePAM  Tablet 0.5 milliGRAM(s) Oral at bedtime  dextrose 10% Bolus 250 milliLiter(s) IV Bolus once  dextrose 5% + sodium chloride 0.45%. 1000 milliLiter(s) (75 mL/Hr) IV Continuous <Continuous>  dextrose 5%. 1000 milliLiter(s) (50 mL/Hr) IV Continuous <Continuous>  dextrose 50% Injectable 12.5 Gram(s) IV Push once  dextrose 50% Injectable 25 Gram(s) IV Push once  dextrose 50% Injectable 25 Gram(s) IV Push once  donepezil 10 milliGRAM(s) Oral at bedtime  heparin   Injectable 5000 Unit(s) SubCutaneous every 8 hours  insulin lispro (HumaLOG) corrective regimen sliding scale   SubCutaneous three times a day before meals  levothyroxine 50 MICROGram(s) Oral daily      pt is not in pain now.   Vital Signs Last 24 Hrs  T(C): 37.2 (09 Jun 2020 07:56), Max: 37.3 (09 Jun 2020 05:50)  T(F): 98.9 (09 Jun 2020 07:56), Max: 99.1 (09 Jun 2020 05:50)  HR: 62 (09 Jun 2020 07:56) (60 - 94)  BP: 157/67 (09 Jun 2020 07:56) (115/52 - 157/67)  RR: 18 (09 Jun 2020 07:56) (18 - 18)  SpO2: 97% (09 Jun 2020 07:56) (96% - 98%)                          8.7    9.19  )-----------( 272      ( 09 Jun 2020 05:39 )             29.2   06-09    139  |  104  |  33<H>  ----------------------------<  185<H>  5.0   |  24  |  1.3    Ca    9.2      09 Jun 2020 05:39    TPro  7.2  /  Alb  4.4  /  TBili  <0.2  /  DBili  x   /  AST  27  /  ALT  19  /  AlkPhos  132<H>  06-08    < from: 12 Lead ECG (06.08.20 @ 19:56) >  Ventricular Rate 92 BPM    Atrial Rate 92 BPM    P-R Interval 144 ms    QRS Duration 84 ms    Q-T Interval 356 ms    QTC Calculation(Bezet) 440 ms    P Axis 79 degrees    R Axis -36 degrees    T Axis 63 degrees    Diagnosis Line Sinus rhythm with occasional Premature ventricular complexes  Left axis deviation  Left anterior fascicular block  Abnormal ECG    < end of copied text >    < from: Xray Femur 2 Views, Right (06.09.20 @ 06:19) >  Acute intertrochanteric/subtrochanteric right femoral fracture    < end of copied text >    < from: Xray Chest 1 View-PORTABLE IMMEDIATE (06.08.20 @ 18:49) >  No radiographic evidence of acute cardiopulmonary disease.    < end of copied text >    a/p  #acute intratrochanteric and subtrochanteric right femoral fracuture due to mechanical fall.   good functional stautus, as per daughter pt was active and doing pushups prior to fall.     #anemia acute drop in hct,  transfuse prbc, repeat cbc     #HTN, bp is acceptable for surgery, cont meds, monitor post op   #dyslipidemia. cont meds  #hypothyroidism, cont meds  #mood dx, cont meds  #demenia, cont meds    pt is moderate risk for surgery, no further pre op workup is needed. will transfuse [facility]   DVT prophylaxis,   full code Pre Op: planned procedure ORIF    HPI:  pt is a 86 years old m with PMH  of advanced  alzheimer , mood disorder , HTN , presented to ED  after  being found at nursing home on Right  side on ground this evening. Circumstances of fall are unclear per NH , no downtime known. Pt known to be ambulatory at baseline. Pt only complaining of pain to Right  hip pain. On presentation to ED pt found to have right intertrochanteric fracture . Pt evaluated by orthopedics . Pt will go for ORIF today.     PAST MEDICAL & SURGICAL HISTORY:  alzhimer HTN, Mood d/o, hypothyroidism, hyperlipidemia,     allergy: NKDA    Home Medications:  atorvastatin 40 mg oral tablet: 1 tab(s) orally once a day (08 Jun 2020 22:23)  citalopram 20 mg oral tablet: 1 tab(s) orally once a day (08 Jun 2020 22:23)  clonazePAM 0.5 mg oral tablet: 1 tab(s) orally once a day (at bedtime) (08 Jun 2020 22:23)  donepezil 10 mg oral tablet: 1 tab(s) orally once a day (at bedtime) (08 Jun 2020 22:23)  lisinopril 5 mg oral tablet: 1 tab(s) orally once a day (08 Jun 2020 22:24)  Synthroid 50 mcg (0.05 mg) oral tablet: 1 tab(s) orally once a day (08 Jun 2020 22:24)      MEDICATIONS  (STANDING):  atorvastatin Oral Tab/Cap - Peds 40 milliGRAM(s) Oral daily  citalopram 20 milliGRAM(s) Oral daily  clonazePAM  Tablet 0.5 milliGRAM(s) Oral at bedtime  dextrose 10% Bolus 250 milliLiter(s) IV Bolus once  dextrose 5% + sodium chloride 0.45%. 1000 milliLiter(s) (75 mL/Hr) IV Continuous <Continuous>  dextrose 5%. 1000 milliLiter(s) (50 mL/Hr) IV Continuous <Continuous>  dextrose 50% Injectable 12.5 Gram(s) IV Push once  dextrose 50% Injectable 25 Gram(s) IV Push once  dextrose 50% Injectable 25 Gram(s) IV Push once  donepezil 10 milliGRAM(s) Oral at bedtime  heparin   Injectable 5000 Unit(s) SubCutaneous every 8 hours  insulin lispro (HumaLOG) corrective regimen sliding scale   SubCutaneous three times a day before meals  levothyroxine 50 MICROGram(s) Oral daily      pt is not in pain now.   Vital Signs Last 24 Hrs  T(C): 37.2 (09 Jun 2020 07:56), Max: 37.3 (09 Jun 2020 05:50)  T(F): 98.9 (09 Jun 2020 07:56), Max: 99.1 (09 Jun 2020 05:50)  HR: 62 (09 Jun 2020 07:56) (60 - 94)  BP: 157/67 (09 Jun 2020 07:56) (115/52 - 157/67)  RR: 18 (09 Jun 2020 07:56) (18 - 18)  SpO2: 97% (09 Jun 2020 07:56) (96% - 98%)                          8.7    9.19  )-----------( 272      ( 09 Jun 2020 05:39 )             29.2   06-09    139  |  104  |  33<H>  ----------------------------<  185<H>  5.0   |  24  |  1.3    Ca    9.2      09 Jun 2020 05:39    TPro  7.2  /  Alb  4.4  /  TBili  <0.2  /  DBili  x   /  AST  27  /  ALT  19  /  AlkPhos  132<H>  06-08    < from: 12 Lead ECG (06.08.20 @ 19:56) >  Ventricular Rate 92 BPM    Atrial Rate 92 BPM    P-R Interval 144 ms    QRS Duration 84 ms    Q-T Interval 356 ms    QTC Calculation(Bezet) 440 ms    P Axis 79 degrees    R Axis -36 degrees    T Axis 63 degrees    Diagnosis Line Sinus rhythm with occasional Premature ventricular complexes  Left axis deviation  Left anterior fascicular block  Abnormal ECG    < end of copied text >    < from: Xray Femur 2 Views, Right (06.09.20 @ 06:19) >  Acute intertrochanteric/subtrochanteric right femoral fracture    < end of copied text >    < from: Xray Chest 1 View-PORTABLE IMMEDIATE (06.08.20 @ 18:49) >  No radiographic evidence of acute cardiopulmonary disease.    < end of copied text >    a/p  #acute intratrochanteric and subtrochanteric right femoral fracuture due to mechanical fall.   good functional stautus, as per daughter pt was active and doing pushups prior to fall.     #anemia acute drop in hct,  transfuse prbc, repeat cbc     #ckd, at baseline    #HTN, bp is acceptable for surgery, cont meds, monitor post op   #dyslipidemia. cont meds  #hypothyroidism, cont meds  #mood dx, cont meds  #demenia, cont meds    pt is moderate risk for surgery, no further pre op workup is needed. will transfuse [facility]   DVT prophylaxis,   full code

## 2020-06-09 NOTE — PROGRESS NOTE ADULT - SUBJECTIVE AND OBJECTIVE BOX
CORAL GAGE 86y Male  MRN#: [id number]       SUBJECTIVE  Patient is a 86y old Male who presents with a chief complaint of Fall (08 Jun 2020 22:19)  Currently admitted to medicine with the primary diagnosis of Fall s/p right hip intertrochanteric fracture. The patient denies pain, is not sure why he is in the hospital.      OBJECTIVE  PAST MEDICAL & SURGICAL HISTORY    ALLERGIES:  No Known Allergies    MEDICATIONS:  STANDING MEDICATIONS  atorvastatin Oral Tab/Cap - Peds 40 milliGRAM(s) Oral daily  citalopram 20 milliGRAM(s) Oral daily  clonazePAM  Tablet 0.5 milliGRAM(s) Oral at bedtime  dextrose 5% + sodium chloride 0.45%. 1000 milliLiter(s) IV Continuous <Continuous>  dextrose 5%. 1000 milliLiter(s) IV Continuous <Continuous>  dextrose 50% Injectable 12.5 Gram(s) IV Push once  dextrose 50% Injectable 25 Gram(s) IV Push once  dextrose 50% Injectable 25 Gram(s) IV Push once  donepezil 10 milliGRAM(s) Oral at bedtime  heparin   Injectable 5000 Unit(s) SubCutaneous every 8 hours  insulin lispro (HumaLOG) corrective regimen sliding scale   SubCutaneous three times a day before meals  levothyroxine 50 MICROGram(s) Oral daily    PRN MEDICATIONS  dextrose 40% Gel 15 Gram(s) Oral once PRN  glucagon  Injectable 1 milliGRAM(s) IntraMuscular once PRN  morphine  - Injectable 2 milliGRAM(s) IV Push every 4 hours PRN      VITAL SIGNS: Last 24 Hours  T(C): 37.2 (09 Jun 2020 07:56), Max: 37.3 (09 Jun 2020 05:50)  T(F): 98.9 (09 Jun 2020 07:56), Max: 99.1 (09 Jun 2020 05:50)  HR: 62 (09 Jun 2020 07:56) (60 - 94)  BP: 157/67 (09 Jun 2020 07:56) (115/52 - 157/67)  BP(mean): --  RR: 18 (09 Jun 2020 07:56) (18 - 18)  SpO2: 97% (09 Jun 2020 07:56) (96% - 98%)    LABS:                        8.7    9.19  )-----------( 272      ( 09 Jun 2020 05:39 )             29.2     06-09    139  |  104  |  33<H>  ----------------------------<  185<H>  5.0   |  24  |  1.3    Ca    9.2      09 Jun 2020 05:39    TPro  7.2  /  Alb  4.4  /  TBili  <0.2  /  DBili  x   /  AST  27  /  ALT  19  /  AlkPhos  132<H>  06-08    PT/INR - ( 08 Jun 2020 19:16 )   PT: 10.70 sec;   INR: 0.93 ratio         PTT - ( 08 Jun 2020 19:16 )  PTT:26.5 sec      Creatine Kinase, Serum: 73 U/L (06-08-20 @ 23:59)  Troponin T, Serum: <0.01 ng/mL (06-08-20 @ 23:59)      CARDIAC MARKERS ( 08 Jun 2020 23:59 )  x     / <0.01 ng/mL / 73 U/L / x     / x          RADIOLOGY:    < from: Xray Femur 2 Views, Right (06.09.20 @ 06:19) >  EXAM:  XR FEMUR 2 VIEWS RT          PROCEDURE DATE:  06/09/2020      INTERPRETATION:  Clinical History / Reason for exam: Fracture    TECHNIQUE: 4 views right femur    FINDINGS: Acute right intertrochanteric fracture present with split fracture extending to subtrochanteric femur along the lesser trochanter. Moderate right hip degenerative change. Remainder of femur demonstrates external rotation.    IMPRESSION: Acute intertrochanteric/subtrochanteric right femoral fracture    PHYSICAL EXAM:    GENERAL: NAD, well-developed, AAOx2, of pleasant disposition  HEENT:  Atraumatic, Normocephalic. Conjunctiva and sclera clear, No JVD  PULMONARY: Clear to auscultation bilaterally; No wheeze  CARDIOVASCULAR: Regular rate and rhythm; No murmurs, rubs, or gallops  GASTROINTESTINAL: Soft, Nontender, Nondistended; Bowel sounds present  MUSCULOSKELETAL: No clubbing, cyanosis, or edema  NEUROLOGY: non-focal  SKIN: No rashes or lesions

## 2020-06-10 DIAGNOSIS — S72.141A DISPLACED INTERTROCHANTERIC FRACTURE OF RIGHT FEMUR, INITIAL ENCOUNTER FOR CLOSED FRACTURE: ICD-10-CM

## 2020-06-10 LAB
A1C WITH ESTIMATED AVERAGE GLUCOSE RESULT: 5.9 % — HIGH (ref 4–5.6)
ANION GAP SERPL CALC-SCNC: 12 MMOL/L — SIGNIFICANT CHANGE UP (ref 7–14)
BASOPHILS # BLD AUTO: 0.03 K/UL — SIGNIFICANT CHANGE UP (ref 0–0.2)
BASOPHILS NFR BLD AUTO: 0.3 % — SIGNIFICANT CHANGE UP (ref 0–1)
BUN SERPL-MCNC: 31 MG/DL — HIGH (ref 10–20)
CALCIUM SERPL-MCNC: 8.5 MG/DL — SIGNIFICANT CHANGE UP (ref 8.5–10.1)
CHLORIDE SERPL-SCNC: 102 MMOL/L — SIGNIFICANT CHANGE UP (ref 98–110)
CO2 SERPL-SCNC: 22 MMOL/L — SIGNIFICANT CHANGE UP (ref 17–32)
CREAT SERPL-MCNC: 1.5 MG/DL — SIGNIFICANT CHANGE UP (ref 0.7–1.5)
EOSINOPHIL # BLD AUTO: 0 K/UL — SIGNIFICANT CHANGE UP (ref 0–0.7)
EOSINOPHIL NFR BLD AUTO: 0 % — SIGNIFICANT CHANGE UP (ref 0–8)
ESTIMATED AVERAGE GLUCOSE: 123 MG/DL — HIGH (ref 68–114)
GLUCOSE BLDC GLUCOMTR-MCNC: 139 MG/DL — HIGH (ref 70–99)
GLUCOSE BLDC GLUCOMTR-MCNC: 140 MG/DL — HIGH (ref 70–99)
GLUCOSE BLDC GLUCOMTR-MCNC: 171 MG/DL — HIGH (ref 70–99)
GLUCOSE BLDC GLUCOMTR-MCNC: 197 MG/DL — HIGH (ref 70–99)
GLUCOSE SERPL-MCNC: 126 MG/DL — HIGH (ref 70–99)
HCT VFR BLD CALC: 26.1 % — LOW (ref 42–52)
HGB BLD-MCNC: 8.1 G/DL — LOW (ref 14–18)
IMM GRANULOCYTES NFR BLD AUTO: 0.6 % — HIGH (ref 0.1–0.3)
LYMPHOCYTES # BLD AUTO: 1.11 K/UL — LOW (ref 1.2–3.4)
LYMPHOCYTES # BLD AUTO: 12.3 % — LOW (ref 20.5–51.1)
MAGNESIUM SERPL-MCNC: 1.6 MG/DL — LOW (ref 1.8–2.4)
MCHC RBC-ENTMCNC: 22.2 PG — LOW (ref 27–31)
MCHC RBC-ENTMCNC: 31 G/DL — LOW (ref 32–37)
MCV RBC AUTO: 71.5 FL — LOW (ref 80–94)
MONOCYTES # BLD AUTO: 1.06 K/UL — HIGH (ref 0.1–0.6)
MONOCYTES NFR BLD AUTO: 11.7 % — HIGH (ref 1.7–9.3)
NEUTROPHILS # BLD AUTO: 6.81 K/UL — HIGH (ref 1.4–6.5)
NEUTROPHILS NFR BLD AUTO: 75.1 % — SIGNIFICANT CHANGE UP (ref 42.2–75.2)
NRBC # BLD: 0 /100 WBCS — SIGNIFICANT CHANGE UP (ref 0–0)
PLATELET # BLD AUTO: 201 K/UL — SIGNIFICANT CHANGE UP (ref 130–400)
POTASSIUM SERPL-MCNC: 4.9 MMOL/L — SIGNIFICANT CHANGE UP (ref 3.5–5)
POTASSIUM SERPL-SCNC: 4.9 MMOL/L — SIGNIFICANT CHANGE UP (ref 3.5–5)
RBC # BLD: 3.65 M/UL — LOW (ref 4.7–6.1)
RBC # FLD: 20.6 % — HIGH (ref 11.5–14.5)
SODIUM SERPL-SCNC: 136 MMOL/L — SIGNIFICANT CHANGE UP (ref 135–146)
WBC # BLD: 9.06 K/UL — SIGNIFICANT CHANGE UP (ref 4.8–10.8)
WBC # FLD AUTO: 9.06 K/UL — SIGNIFICANT CHANGE UP (ref 4.8–10.8)

## 2020-06-10 PROCEDURE — 99233 SBSQ HOSP IP/OBS HIGH 50: CPT

## 2020-06-10 PROCEDURE — 93306 TTE W/DOPPLER COMPLETE: CPT | Mod: 26

## 2020-06-10 RX ORDER — SODIUM CHLORIDE 9 MG/ML
1000 INJECTION, SOLUTION INTRAVENOUS
Refills: 0 | Status: DISCONTINUED | OUTPATIENT
Start: 2020-06-10 | End: 2020-06-11

## 2020-06-10 RX ORDER — DEXTROSE 10 % IN WATER 10 %
250 INTRAVENOUS SOLUTION INTRAVENOUS ONCE
Refills: 0 | Status: DISCONTINUED | OUTPATIENT
Start: 2020-06-09 | End: 2020-06-11

## 2020-06-10 RX ORDER — CITALOPRAM 10 MG/1
20 TABLET, FILM COATED ORAL DAILY
Refills: 0 | Status: DISCONTINUED | OUTPATIENT
Start: 2020-06-09 | End: 2020-06-14

## 2020-06-10 RX ORDER — DONEPEZIL HYDROCHLORIDE 10 MG/1
10 TABLET, FILM COATED ORAL AT BEDTIME
Refills: 0 | Status: DISCONTINUED | OUTPATIENT
Start: 2020-06-09 | End: 2020-06-14

## 2020-06-10 RX ORDER — SODIUM CHLORIDE 9 MG/ML
1000 INJECTION, SOLUTION INTRAVENOUS
Refills: 0 | Status: DISCONTINUED | OUTPATIENT
Start: 2020-06-09 | End: 2020-06-14

## 2020-06-10 RX ORDER — LEVOTHYROXINE SODIUM 125 MCG
50 TABLET ORAL DAILY
Refills: 0 | Status: DISCONTINUED | OUTPATIENT
Start: 2020-06-09 | End: 2020-06-14

## 2020-06-10 RX ORDER — DEXTROSE 50 % IN WATER 50 %
15 SYRINGE (ML) INTRAVENOUS ONCE
Refills: 0 | Status: DISCONTINUED | OUTPATIENT
Start: 2020-06-09 | End: 2020-06-14

## 2020-06-10 RX ORDER — ACETAMINOPHEN 500 MG
650 TABLET ORAL ONCE
Refills: 0 | Status: COMPLETED | OUTPATIENT
Start: 2020-06-10 | End: 2020-06-10

## 2020-06-10 RX ORDER — DEXTROSE 50 % IN WATER 50 %
25 SYRINGE (ML) INTRAVENOUS ONCE
Refills: 0 | Status: DISCONTINUED | OUTPATIENT
Start: 2020-06-09 | End: 2020-06-14

## 2020-06-10 RX ORDER — SODIUM CHLORIDE 9 MG/ML
1000 INJECTION, SOLUTION INTRAVENOUS
Refills: 0 | Status: DISCONTINUED | OUTPATIENT
Start: 2020-06-09 | End: 2020-06-10

## 2020-06-10 RX ORDER — MORPHINE SULFATE 50 MG/1
2 CAPSULE, EXTENDED RELEASE ORAL EVERY 4 HOURS
Refills: 0 | Status: DISCONTINUED | OUTPATIENT
Start: 2020-06-09 | End: 2020-06-14

## 2020-06-10 RX ORDER — INSULIN HUMAN 100 [IU]/ML
10 INJECTION, SOLUTION SUBCUTANEOUS ONCE
Refills: 0 | Status: DISCONTINUED | OUTPATIENT
Start: 2020-06-09 | End: 2020-06-14

## 2020-06-10 RX ORDER — GLUCAGON INJECTION, SOLUTION 0.5 MG/.1ML
1 INJECTION, SOLUTION SUBCUTANEOUS ONCE
Refills: 0 | Status: DISCONTINUED | OUTPATIENT
Start: 2020-06-09 | End: 2020-06-14

## 2020-06-10 RX ORDER — CLONAZEPAM 1 MG
0.5 TABLET ORAL AT BEDTIME
Refills: 0 | Status: DISCONTINUED | OUTPATIENT
Start: 2020-06-09 | End: 2020-06-14

## 2020-06-10 RX ORDER — ATORVASTATIN CALCIUM 80 MG/1
40 TABLET, FILM COATED ORAL DAILY
Refills: 0 | Status: DISCONTINUED | OUTPATIENT
Start: 2020-06-09 | End: 2020-06-14

## 2020-06-10 RX ORDER — ONDANSETRON 8 MG/1
4 TABLET, FILM COATED ORAL ONCE
Refills: 0 | Status: DISCONTINUED | OUTPATIENT
Start: 2020-06-10 | End: 2020-06-10

## 2020-06-10 RX ORDER — DEXTROSE 50 % IN WATER 50 %
12.5 SYRINGE (ML) INTRAVENOUS ONCE
Refills: 0 | Status: DISCONTINUED | OUTPATIENT
Start: 2020-06-09 | End: 2020-06-14

## 2020-06-10 RX ORDER — HEPARIN SODIUM 5000 [USP'U]/ML
5000 INJECTION INTRAVENOUS; SUBCUTANEOUS EVERY 8 HOURS
Refills: 0 | Status: DISCONTINUED | OUTPATIENT
Start: 2020-06-09 | End: 2020-06-14

## 2020-06-10 RX ORDER — INSULIN LISPRO 100/ML
VIAL (ML) SUBCUTANEOUS
Refills: 0 | Status: DISCONTINUED | OUTPATIENT
Start: 2020-06-09 | End: 2020-06-14

## 2020-06-10 RX ORDER — CEFAZOLIN SODIUM 1 G
2000 VIAL (EA) INJECTION EVERY 8 HOURS
Refills: 0 | Status: COMPLETED | OUTPATIENT
Start: 2020-06-10 | End: 2020-06-10

## 2020-06-10 RX ORDER — MAGNESIUM SULFATE 500 MG/ML
2 VIAL (ML) INJECTION ONCE
Refills: 0 | Status: COMPLETED | OUTPATIENT
Start: 2020-06-10 | End: 2020-06-10

## 2020-06-10 RX ORDER — SODIUM CHLORIDE 9 MG/ML
1000 INJECTION, SOLUTION INTRAVENOUS
Refills: 0 | Status: DISCONTINUED | OUTPATIENT
Start: 2020-06-10 | End: 2020-06-10

## 2020-06-10 RX ORDER — DEXTROSE 10 % IN WATER 10 %
250 INTRAVENOUS SOLUTION INTRAVENOUS ONCE
Refills: 0 | Status: DISCONTINUED | OUTPATIENT
Start: 2020-06-09 | End: 2020-06-14

## 2020-06-10 RX ORDER — MORPHINE SULFATE 50 MG/1
4 CAPSULE, EXTENDED RELEASE ORAL
Refills: 0 | Status: DISCONTINUED | OUTPATIENT
Start: 2020-06-10 | End: 2020-06-10

## 2020-06-10 RX ADMIN — ATORVASTATIN CALCIUM 40 MILLIGRAM(S): 80 TABLET, FILM COATED ORAL at 21:46

## 2020-06-10 RX ADMIN — Medication 100 MILLIGRAM(S): at 05:10

## 2020-06-10 RX ADMIN — HEPARIN SODIUM 5000 UNIT(S): 5000 INJECTION INTRAVENOUS; SUBCUTANEOUS at 05:10

## 2020-06-10 RX ADMIN — HEPARIN SODIUM 5000 UNIT(S): 5000 INJECTION INTRAVENOUS; SUBCUTANEOUS at 14:23

## 2020-06-10 RX ADMIN — SODIUM CHLORIDE 75 MILLILITER(S): 9 INJECTION, SOLUTION INTRAVENOUS at 02:29

## 2020-06-10 RX ADMIN — HEPARIN SODIUM 5000 UNIT(S): 5000 INJECTION INTRAVENOUS; SUBCUTANEOUS at 21:45

## 2020-06-10 RX ADMIN — DONEPEZIL HYDROCHLORIDE 10 MILLIGRAM(S): 10 TABLET, FILM COATED ORAL at 21:46

## 2020-06-10 RX ADMIN — Medication 100 MILLIGRAM(S): at 14:23

## 2020-06-10 RX ADMIN — Medication 1: at 11:14

## 2020-06-10 RX ADMIN — Medication 50 GRAM(S): at 11:14

## 2020-06-10 RX ADMIN — CITALOPRAM 20 MILLIGRAM(S): 10 TABLET, FILM COATED ORAL at 11:15

## 2020-06-10 RX ADMIN — SODIUM CHLORIDE 75 MILLILITER(S): 9 INJECTION, SOLUTION INTRAVENOUS at 00:41

## 2020-06-10 RX ADMIN — Medication 1: at 16:31

## 2020-06-10 RX ADMIN — Medication 0.5 MILLIGRAM(S): at 21:45

## 2020-06-10 RX ADMIN — Medication 100 MILLIGRAM(S): at 22:10

## 2020-06-10 RX ADMIN — Medication 650 MILLIGRAM(S): at 21:45

## 2020-06-10 RX ADMIN — Medication 50 MICROGRAM(S): at 05:10

## 2020-06-10 NOTE — PROGRESS NOTE ADULT - SUBJECTIVE AND OBJECTIVE BOX
ORTHO PROGRESS NOTE     86yMale POD # 1 S/P R hip ORIF      Patient seen and examined at bedside . The patient is awake and in NAD. No complaints of chest pain, SOB, N/V.    MEDICATIONS  (STANDING):  atorvastatin Oral Tab/Cap - Peds 40 milliGRAM(s) Oral daily  ceFAZolin   IVPB 2000 milliGRAM(s) IV Intermittent every 8 hours  citalopram 20 milliGRAM(s) Oral daily  clonazePAM  Tablet 0.5 milliGRAM(s) Oral at bedtime  dextrose 10% Bolus 250 milliLiter(s) IV Bolus once  dextrose 10% Bolus 250 milliLiter(s) IV Bolus once  dextrose 5% + sodium chloride 0.9%. 1000 milliLiter(s) (75 mL/Hr) IV Continuous <Continuous>  dextrose 5%. 1000 milliLiter(s) (50 mL/Hr) IV Continuous <Continuous>  dextrose 50% Injectable 12.5 Gram(s) IV Push once  dextrose 50% Injectable 25 Gram(s) IV Push once  dextrose 50% Injectable 25 Gram(s) IV Push once  donepezil 10 milliGRAM(s) Oral at bedtime  heparin   Injectable 5000 Unit(s) SubCutaneous every 8 hours  insulin lispro (HumaLOG) corrective regimen sliding scale   SubCutaneous three times a day before meals  insulin regular  human recombinant. 10 Unit(s) SubCutaneous once  levothyroxine 50 MICROGram(s) Oral daily    MEDICATIONS  (PRN):  dextrose 40% Gel 15 Gram(s) Oral once PRN Blood Glucose LESS THAN 70 milliGRAM(s)/deciliter  glucagon  Injectable 1 milliGRAM(s) IntraMuscular once PRN Glucose LESS THAN 70 milligrams/deciliter  morphine  - Injectable 2 milliGRAM(s) IV Push every 4 hours PRN Moderate Pain (4 - 6)      Vital Signs Last 24 Hrs  T(C): 37 (10 Anthony 2020 01:30), Max: 38.7 (09 Jun 2020 20:08)  T(F): 98.6 (10 Anthony 2020 01:30), Max: 101.7 (09 Jun 2020 20:08)  HR: 104 (10 Anthony 2020 01:30) (62 - 140)  BP: 97/60 (10 Anthony 2020 01:30) (92/60 - 159/76)  BP(mean): 69 (10 Anthony 2020 00:45) (69 - 102)  RR: 19 (10 Anthony 2020 01:30) (18 - 23)  SpO2: 97% (10 Anthony 2020 01:30) (95% - 99%)    PE:   Dressing C/D/I   Motor intact distally  SILT distally  CR<2sec  palpable pulses                               8.1    9.06  )-----------( 201      ( 10 Anthony 2020 05:15 )             26.1     06-09    139  |  104  |  33<H>  ----------------------------<  185<H>  5.0   |  24  |  1.3    Ca    9.2      09 Jun 2020 05:39    TPro  7.2  /  Alb  4.4  /  TBili  <0.2  /  DBili  x   /  AST  27  /  ALT  19  /  AlkPhos  132<H>  06-08    PT/INR - ( 08 Jun 2020 19:16 )   PT: 10.70 sec;   INR: 0.93 ratio         PTT - ( 08 Jun 2020 19:16 )  PTT:26.5 sec      06-08-20 @ 07:01  -  06-09-20 @ 07:00  --------------------------------------------------------  IN: 375 mL / OUT: 0 mL / NET: 375 mL          A/P: 86yMale POD #1 S/P R hip ORIF , doing well.   OOB to Chair   WBAT RLE  PT/OT  Pain control   Ext icing/elevation  Incentive Spirometry   DVT Prophylaxis   Discharge planning

## 2020-06-10 NOTE — OCCUPATIONAL THERAPY INITIAL EVALUATION ADULT - TRANSFER SAFETY CONCERNS NOTED: SIT/STAND, REHAB EVAL
decreased safety awareness/inability to maintain weight-bearing restrictions w/o assist/decreased balance during turns/poor carryover of weight bearing precautions

## 2020-06-10 NOTE — DIETITIAN INITIAL EVALUATION ADULT. - OTHER INFO
Nutrition Hx PTA: Unable to obtain as emergency contact unreachable via phone, however limited hx obtained from NH documentation. Pt follows a regular diet and takes acidophilus daily.    Pt p/w right intertrochanteric fracture s/p unwitnessed fall. Pt will go for ORIF today. EKG unremarkable; will get TTE to r/o heart disease.

## 2020-06-10 NOTE — DIETITIAN INITIAL EVALUATION ADULT. - NAME AND PHONE
Pt to maintain >75% po intake of meals and snacks over the next 7 days. RD to monitor diet order, energy intake, glucose profile, nutrition focused physical findings, body composition

## 2020-06-10 NOTE — CHART NOTE - NSCHARTNOTEFT_GEN_A_CORE
Upon Nutritional Assessment by the Registered Dietitian your patient was determined to meet criteria / has evidence of the following diagnosis/diagnoses:          [ ]  Mild Protein Calorie Malnutrition        [ ]  Moderate Protein Calorie Malnutrition        [ ] Severe Protein Calorie Malnutrition        [ ] Unspecified Protein Calorie Malnutrition        [x] Underweight / BMI <19        [ ] Morbid Obesity / BMI > 40    Underweight Indicator:  BMI 18.3 [67"/117#]     Findings as based on:  •  Comprehensive nutrition assessment and consultation  •  Calorie counts (nutrient intake analysis)  •  Food acceptance and intake status from observations by staff  •  Follow up  •  Patient education  •  Intervention secondary to interdisciplinary rounds  •   concerns      Treatment:    The following diet has been recommended:      PROVIDER Section:     By signing this assessment you are acknowledging and agree with the diagnosis/diagnoses assigned by the Registered Dietitian    Comments:

## 2020-06-10 NOTE — CHART NOTE - NSCHARTNOTEFT_GEN_A_CORE
Attempted to contact daughter Jacqueline at [phone] for goals of care/MOLST form and to give updates, however despite trying multiple times this AM - unable to reach. Will try again later.

## 2020-06-10 NOTE — GOALS OF CARE CONVERSATION - ADVANCED CARE PLANNING - CONVERSATION DETAILS
Goals of care discussed with daughter Rachelle and she wants to have her father be DNR/DNI as he was prior to coming to the hospital from [facility].

## 2020-06-10 NOTE — DIETITIAN INITIAL EVALUATION ADULT. - FEEDING SKILL
independent/Spoke with pt's RN who reports that pt has a good appetite, consumed 75% of his breakfast this morning.

## 2020-06-10 NOTE — BRIEF OPERATIVE NOTE - NSICDXBRIEFPROCEDURE_GEN_ALL_CORE_FT
PROCEDURES:  Open reduction and internal fixation (ORIF) of right hip using intramedullary kiana 10-Anthony-2020 00:00:47  Remy Batista

## 2020-06-10 NOTE — OCCUPATIONAL THERAPY INITIAL EVALUATION ADULT - NS ASR WT BEARING DETAIL RLE
Pt educated on non-weight bearing R LE. Demonstrates poor understanding and carryover./nonweight-bearing

## 2020-06-10 NOTE — DIETITIAN INITIAL EVALUATION ADULT. - PHYSICAL APPEARANCE
underweight/confused, disoriented. BMI: 18.3, IBW: 148#, no edema noted, skin tear. Upon NFPE, pt displays mild age-related muscle/fat loss.

## 2020-06-10 NOTE — DIETITIAN INITIAL EVALUATION ADULT. - ENERGY NEEDS
Calories: 4058-9853 kcal/day (MSJ x 1-1.2 AF)--advanced age considered  Protein: 53-64 gm/day (1-1.2 gm/kg CBW)  Fluid: 1 ml/kcal

## 2020-06-10 NOTE — PROGRESS NOTE ADULT - SUBJECTIVE AND OBJECTIVE BOX
SUBJECTIVE:    Patient is a 86y old Male who presents with a chief complaint of Fall (10 Anthony 2020 04:11)    Currently admitted to medicine with the primary diagnosis of Fall     Today is hospital day 2d. This morning he is resting comfortably in bed and reports no new issues or overnight events.     ROS:   CONSTITUTIONAL: No weakness, fevers or chills   EYES/ENT: No visual changes; No vertigo or throat pain   NECK: No pain or stiffness   RESPIRATORY: No cough, wheezing, hemoptysis; No shortness of breath   CARDIOVASCULAR: No chest pain or palpitations   GASTROINTESTINAL: No abdominal or epigastric pain. No nausea, vomiting, or hematemesis; No diarrhea or constipation. No melena or hematochezia.  GENITOURINARY: No dysuria, frequency or hematuria  NEUROLOGICAL: No numbness or weakness  SKIN: No itching, rashes      PAST MEDICAL & SURGICAL HISTORY    SOCIAL HISTORY:    ALLERGIES:  No Known Allergies    MEDICATIONS:  STANDING MEDICATIONS  atorvastatin Oral Tab/Cap - Peds 40 milliGRAM(s) Oral daily  ceFAZolin   IVPB 2000 milliGRAM(s) IV Intermittent every 8 hours  citalopram 20 milliGRAM(s) Oral daily  clonazePAM  Tablet 0.5 milliGRAM(s) Oral at bedtime  dextrose 10% Bolus 250 milliLiter(s) IV Bolus once  dextrose 10% Bolus 250 milliLiter(s) IV Bolus once  dextrose 5% + sodium chloride 0.9%. 1000 milliLiter(s) IV Continuous <Continuous>  dextrose 5%. 1000 milliLiter(s) IV Continuous <Continuous>  dextrose 50% Injectable 12.5 Gram(s) IV Push once  dextrose 50% Injectable 25 Gram(s) IV Push once  dextrose 50% Injectable 25 Gram(s) IV Push once  donepezil 10 milliGRAM(s) Oral at bedtime  heparin   Injectable 5000 Unit(s) SubCutaneous every 8 hours  insulin lispro (HumaLOG) corrective regimen sliding scale   SubCutaneous three times a day before meals  insulin regular  human recombinant. 10 Unit(s) SubCutaneous once  levothyroxine 50 MICROGram(s) Oral daily  magnesium sulfate  IVPB 2 Gram(s) IV Intermittent once    PRN MEDICATIONS  dextrose 40% Gel 15 Gram(s) Oral once PRN  glucagon  Injectable 1 milliGRAM(s) IntraMuscular once PRN  morphine  - Injectable 2 milliGRAM(s) IV Push every 4 hours PRN    VITALS:   T(F): 97.5  HR: 101  BP: 105/56  RR: 18  SpO2: 97%    LABS:                          8.1    9.06  )-----------( 201      ( 10 Anthony 2020 05:15 )             26.1     06-10    136  |  102  |  31<H>  ----------------------------<  126<H>  4.9   |  22  |  1.5    Ca    8.5      10 Anthony 2020 05:15  Mg     1.6     06-10    TPro  7.2  /  Alb  4.4  /  TBili  <0.2  /  DBili  x   /  AST  27  /  ALT  19  /  AlkPhos  132<H>  06-08    PT/INR - ( 08 Jun 2020 19:16 )   PT: 10.70 sec;   INR: 0.93 ratio         PTT - ( 08 Jun 2020 19:16 )  PTT:26.5 sec          CARDIAC MARKERS ( 08 Jun 2020 23:59 )  x     / <0.01 ng/mL / 73 U/L / x     / x        RADIOLOGY: no new today    PHYSICAL EXAM:  GEN: No acute distress  HEENT: normocephalic, atraumatic, aniceteric  LUNGS: Clear to auscultation bilaterally, no rales/wheezing/ rhonchi  HEART: S1/S2 present. RRR, no murmurs  ABD: Soft, non-tender, non-distended. Bowel sounds present  EXT: NC/NC/NE/2+PP/CABRAL ; able to wiggle toes b/l le  NEURO: AAOX1, normal affect      ASSESSMENT AND PLAN:    pt is a 86 years old m with PMH  of advanced  alzheimer , mood disorder , HTN , presented to ED  after  being found at nursing home on Right  side on ground this evening. Circumstances of fall are unclear per NH , no downtime known. Pt known to be ambulatory at baseline. Pt only complaining of pain to Right  hip pain. On presentation to ED pt found to have right intertrochanteric fracture . Pt evaluated by orthopedics . Pt will go for ORIF today.    # Right Intertrochanteric fracture s/p unwitnessed fall  - NWB right leg  - pain control  - dvt ppx heparin subq  - ortho f/u  - EKG unremarkable ; will get TTE to r/o heart disease     #Advanced Alzheimers  - continue donepezil , clonazepam and citalopram    # HTN  - continue lisinopril    #Hypothyroidism  - continue synthyroid    #Hypomagnasemia  - Mg 1.6 today, will replete and follow    #Diet: DASh/TLC  # DVT PPX heparin sq  #Dispo :from [facility]    Handoff: clinical improvement    Attempted to contact daughter Jacqueline at [phone] for updates and code status, however was unable to reach multiple times throughout the day SUBJECTIVE:    Patient is a 86y old Male who presents with a chief complaint of Fall (10 Anthony 2020 04:11)    Currently admitted to medicine with the primary diagnosis of Fall     Today is hospital day 2d. This morning he is resting comfortably in bed and reports no new issues or overnight events.     ROS:   CONSTITUTIONAL: No weakness, fevers or chills   EYES/ENT: No visual changes; No vertigo or throat pain   NECK: No pain or stiffness   RESPIRATORY: No cough, wheezing, hemoptysis; No shortness of breath   CARDIOVASCULAR: No chest pain or palpitations   GASTROINTESTINAL: No abdominal or epigastric pain. No nausea, vomiting, or hematemesis; No diarrhea or constipation. No melena or hematochezia.  GENITOURINARY: No dysuria, frequency or hematuria  NEUROLOGICAL: No numbness or weakness  SKIN: No itching, rashes    PAST MEDICAL & SURGICAL HISTORY    SOCIAL HISTORY:    ALLERGIES:  No Known Allergies    MEDICATIONS:  STANDING MEDICATIONS  atorvastatin Oral Tab/Cap - Peds 40 milliGRAM(s) Oral daily  ceFAZolin   IVPB 2000 milliGRAM(s) IV Intermittent every 8 hours  citalopram 20 milliGRAM(s) Oral daily  clonazePAM  Tablet 0.5 milliGRAM(s) Oral at bedtime  dextrose 10% Bolus 250 milliLiter(s) IV Bolus once  dextrose 10% Bolus 250 milliLiter(s) IV Bolus once  dextrose 5% + sodium chloride 0.9%. 1000 milliLiter(s) IV Continuous <Continuous>  dextrose 5%. 1000 milliLiter(s) IV Continuous <Continuous>  dextrose 50% Injectable 12.5 Gram(s) IV Push once  dextrose 50% Injectable 25 Gram(s) IV Push once  dextrose 50% Injectable 25 Gram(s) IV Push once  donepezil 10 milliGRAM(s) Oral at bedtime  heparin   Injectable 5000 Unit(s) SubCutaneous every 8 hours  insulin lispro (HumaLOG) corrective regimen sliding scale   SubCutaneous three times a day before meals  insulin regular  human recombinant. 10 Unit(s) SubCutaneous once  levothyroxine 50 MICROGram(s) Oral daily  magnesium sulfate  IVPB 2 Gram(s) IV Intermittent once    PRN MEDICATIONS  dextrose 40% Gel 15 Gram(s) Oral once PRN  glucagon  Injectable 1 milliGRAM(s) IntraMuscular once PRN  morphine  - Injectable 2 milliGRAM(s) IV Push every 4 hours PRN    VITALS:   T(F): 97.5  HR: 101  BP: 105/56  RR: 18  SpO2: 97%    LABS:                          8.1    9.06  )-----------( 201      ( 10 Anthony 2020 05:15 )             26.1     06-10    136  |  102  |  31<H>  ----------------------------<  126<H>  4.9   |  22  |  1.5    Ca    8.5      10 Anthony 2020 05:15  Mg     1.6     06-10    TPro  7.2  /  Alb  4.4  /  TBili  <0.2  /  DBili  x   /  AST  27  /  ALT  19  /  AlkPhos  132<H>  06-08    PT/INR - ( 08 Jun 2020 19:16 )   PT: 10.70 sec;   INR: 0.93 ratio         PTT - ( 08 Jun 2020 19:16 )  PTT:26.5 sec    CARDIAC MARKERS ( 08 Jun 2020 23:59 )  x     / <0.01 ng/mL / 73 U/L / x     / x        RADIOLOGY: no new today    PHYSICAL EXAM:  GEN: No acute distress  HEENT: normocephalic, atraumatic, aniceteric  LUNGS: Clear to auscultation bilaterally, no rales/wheezing/ rhonchi  HEART: S1/S2 present. RRR, no murmurs  ABD: Soft, non-tender, non-distended. Bowel sounds present  EXT: NC/NC/NE/2+PP/CABRAL ; able to wiggle toes b/l le  NEURO: AAOX1, normal affect      ASSESSMENT AND PLAN:    Pt is a 86 years old m with PMH  of advanced  alzheimer , mood disorder , HTN , presented to ED  after  being found at nursing home on Right  side on ground this evening. Circumstances of fall are unclear per NH , no downtime known. Pt known to be ambulatory at baseline. Pt only complaining of pain to Right  hip pain. On presentation to ED pt found to have right intertrochanteric fracture . Pt evaluated by orthopedics . Pt will go for ORIF today.    # Right Intertrochanteric fracture s/p unwitnessed fall  - NWB right leg  - pain control  - dvt ppx heparin subq  - ortho f/u  - EKG unremarkable ; will get TTE to r/o heart disease     #Advanced Alzheimers  - continue donepezil , clonazepam and citalopram    # HTN  - continue lisinopril    #Hypothyroidism  - continue synthyroid    #Hypomagnasemia  - Mg 1.6 today, will replete and follow    #Diet: DASh/TLC  # DVT PPX heparin sq  #Dispo :from [facility]    Handoff: clinical improvement    Attempted to contact daughter Jacqueline at [phone] for updates and code status, however was unable to reach multiple times throughout the day    Attending Attestation:  Pt has been seen and examined. Case and Plan discussed with Orthopedic Attending at bedside and notes reviewed. Agree with above documentation.   Fall complicated by Rt IT Fx s/p ORIF POD#1 - Get PT/OT and likely will need [facility].   Acute Blood Loss Anemia (Hg 11 to 8) expected post op. Monitor H/H this evening and am.  Hypomagnesemia - give MgSo 2g IV x 1 today  Lovenox for DVT proph even upon d/c x 2wks  DNR/DNI  Dementia     Dispo: [facility] / Need Family discussion / d/c planning

## 2020-06-10 NOTE — PHYSICAL THERAPY INITIAL EVALUATION ADULT - GENERAL OBSERVATIONS, REHAB EVAL
8:46-9:14. chart reviewed and discussed at team rounds. Pt. in bed. A &O  x 1 only. Trying to pull out IV. DAYA Soler called  "Locked" IV and secure IV access w/ gauze so pt. doesn't pull it out.  Pt. confused, but allowing start of eval. Able to complete some parts of eval safely, but  others not.  Pt. in bed, relaxed , No apparent distress. DAYA Soler informed.

## 2020-06-11 LAB
ANION GAP SERPL CALC-SCNC: 8 MMOL/L — SIGNIFICANT CHANGE UP (ref 7–14)
APPEARANCE UR: CLEAR — SIGNIFICANT CHANGE UP
BASOPHILS # BLD AUTO: 0.02 K/UL — SIGNIFICANT CHANGE UP (ref 0–0.2)
BASOPHILS NFR BLD AUTO: 0.3 % — SIGNIFICANT CHANGE UP (ref 0–1)
BILIRUB UR-MCNC: NEGATIVE — SIGNIFICANT CHANGE UP
BUN SERPL-MCNC: 29 MG/DL — HIGH (ref 10–20)
CALCIUM SERPL-MCNC: 8.3 MG/DL — LOW (ref 8.5–10.1)
CHLORIDE SERPL-SCNC: 105 MMOL/L — SIGNIFICANT CHANGE UP (ref 98–110)
CO2 SERPL-SCNC: 25 MMOL/L — SIGNIFICANT CHANGE UP (ref 17–32)
COLOR SPEC: SIGNIFICANT CHANGE UP
CREAT SERPL-MCNC: 1.4 MG/DL — SIGNIFICANT CHANGE UP (ref 0.7–1.5)
DIFF PNL FLD: NEGATIVE — SIGNIFICANT CHANGE UP
EOSINOPHIL # BLD AUTO: 0.02 K/UL — SIGNIFICANT CHANGE UP (ref 0–0.7)
EOSINOPHIL NFR BLD AUTO: 0.3 % — SIGNIFICANT CHANGE UP (ref 0–8)
GLUCOSE BLDC GLUCOMTR-MCNC: 114 MG/DL — HIGH (ref 70–99)
GLUCOSE BLDC GLUCOMTR-MCNC: 117 MG/DL — HIGH (ref 70–99)
GLUCOSE BLDC GLUCOMTR-MCNC: 119 MG/DL — HIGH (ref 70–99)
GLUCOSE BLDC GLUCOMTR-MCNC: 158 MG/DL — HIGH (ref 70–99)
GLUCOSE SERPL-MCNC: 122 MG/DL — HIGH (ref 70–99)
GLUCOSE UR QL: NEGATIVE — SIGNIFICANT CHANGE UP
HCT VFR BLD CALC: 22.2 % — LOW (ref 42–52)
HCT VFR BLD CALC: 24 % — LOW (ref 42–52)
HGB BLD-MCNC: 7 G/DL — LOW (ref 14–18)
HGB BLD-MCNC: 7.7 G/DL — LOW (ref 14–18)
IMM GRANULOCYTES NFR BLD AUTO: 0.5 % — HIGH (ref 0.1–0.3)
KETONES UR-MCNC: SIGNIFICANT CHANGE UP
LEUKOCYTE ESTERASE UR-ACNC: NEGATIVE — SIGNIFICANT CHANGE UP
LYMPHOCYTES # BLD AUTO: 1.1 K/UL — LOW (ref 1.2–3.4)
LYMPHOCYTES # BLD AUTO: 14.2 % — LOW (ref 20.5–51.1)
MAGNESIUM SERPL-MCNC: 2 MG/DL — SIGNIFICANT CHANGE UP (ref 1.8–2.4)
MCHC RBC-ENTMCNC: 22.4 PG — LOW (ref 27–31)
MCHC RBC-ENTMCNC: 23.3 PG — LOW (ref 27–31)
MCHC RBC-ENTMCNC: 31.5 G/DL — LOW (ref 32–37)
MCHC RBC-ENTMCNC: 32.1 G/DL — SIGNIFICANT CHANGE UP (ref 32–37)
MCV RBC AUTO: 71.2 FL — LOW (ref 80–94)
MCV RBC AUTO: 72.7 FL — LOW (ref 80–94)
MONOCYTES # BLD AUTO: 1.11 K/UL — HIGH (ref 0.1–0.6)
MONOCYTES NFR BLD AUTO: 14.3 % — HIGH (ref 1.7–9.3)
NEUTROPHILS # BLD AUTO: 5.46 K/UL — SIGNIFICANT CHANGE UP (ref 1.4–6.5)
NEUTROPHILS NFR BLD AUTO: 70.4 % — SIGNIFICANT CHANGE UP (ref 42.2–75.2)
NITRITE UR-MCNC: NEGATIVE — SIGNIFICANT CHANGE UP
NRBC # BLD: 0 /100 WBCS — SIGNIFICANT CHANGE UP (ref 0–0)
NRBC # BLD: 0 /100 WBCS — SIGNIFICANT CHANGE UP (ref 0–0)
PH UR: 5.5 — SIGNIFICANT CHANGE UP (ref 5–8)
PLATELET # BLD AUTO: 168 K/UL — SIGNIFICANT CHANGE UP (ref 130–400)
PLATELET # BLD AUTO: 169 K/UL — SIGNIFICANT CHANGE UP (ref 130–400)
POTASSIUM SERPL-MCNC: 4.4 MMOL/L — SIGNIFICANT CHANGE UP (ref 3.5–5)
POTASSIUM SERPL-SCNC: 4.4 MMOL/L — SIGNIFICANT CHANGE UP (ref 3.5–5)
PROT UR-MCNC: SIGNIFICANT CHANGE UP
RBC # BLD: 3.12 M/UL — LOW (ref 4.7–6.1)
RBC # BLD: 3.3 M/UL — LOW (ref 4.7–6.1)
RBC # FLD: 20.5 % — HIGH (ref 11.5–14.5)
RBC # FLD: 21.7 % — HIGH (ref 11.5–14.5)
SODIUM SERPL-SCNC: 138 MMOL/L — SIGNIFICANT CHANGE UP (ref 135–146)
SP GR SPEC: 1.02 — SIGNIFICANT CHANGE UP (ref 1.01–1.02)
UROBILINOGEN FLD QL: SIGNIFICANT CHANGE UP
WBC # BLD: 7.34 K/UL — SIGNIFICANT CHANGE UP (ref 4.8–10.8)
WBC # BLD: 7.75 K/UL — SIGNIFICANT CHANGE UP (ref 4.8–10.8)
WBC # FLD AUTO: 7.34 K/UL — SIGNIFICANT CHANGE UP (ref 4.8–10.8)
WBC # FLD AUTO: 7.75 K/UL — SIGNIFICANT CHANGE UP (ref 4.8–10.8)

## 2020-06-11 PROCEDURE — 99232 SBSQ HOSP IP/OBS MODERATE 35: CPT

## 2020-06-11 RX ADMIN — Medication 0.5 MILLIGRAM(S): at 21:46

## 2020-06-11 RX ADMIN — Medication 50 MICROGRAM(S): at 05:44

## 2020-06-11 RX ADMIN — HEPARIN SODIUM 5000 UNIT(S): 5000 INJECTION INTRAVENOUS; SUBCUTANEOUS at 14:16

## 2020-06-11 RX ADMIN — CITALOPRAM 20 MILLIGRAM(S): 10 TABLET, FILM COATED ORAL at 11:18

## 2020-06-11 RX ADMIN — HEPARIN SODIUM 5000 UNIT(S): 5000 INJECTION INTRAVENOUS; SUBCUTANEOUS at 05:44

## 2020-06-11 RX ADMIN — ATORVASTATIN CALCIUM 40 MILLIGRAM(S): 80 TABLET, FILM COATED ORAL at 21:55

## 2020-06-11 RX ADMIN — HEPARIN SODIUM 5000 UNIT(S): 5000 INJECTION INTRAVENOUS; SUBCUTANEOUS at 21:33

## 2020-06-11 RX ADMIN — DONEPEZIL HYDROCHLORIDE 10 MILLIGRAM(S): 10 TABLET, FILM COATED ORAL at 21:47

## 2020-06-11 NOTE — PROGRESS NOTE ADULT - SUBJECTIVE AND OBJECTIVE BOX
SUBJECTIVE:    Patient is a 86y old Male who presents with a chief complaint of Fall (2020 09:37)    Currently admitted to medicine with the primary diagnosis of Fall     Today is hospital day 3d. This morning he is resting comfortably in bed and reports no new issues or overnight events.     ROS:   CONSTITUTIONAL: No weakness, fevers or chills   EYES/ENT: No visual changes; No vertigo or throat pain   NECK: No pain or stiffness   RESPIRATORY: No cough, wheezing, hemoptysis; No shortness of breath   CARDIOVASCULAR: No chest pain or palpitations   GASTROINTESTINAL: No abdominal or epigastric pain. No nausea, vomiting, or hematemesis; No diarrhea or constipation. No melena or hematochezia.  GENITOURINARY: No dysuria, frequency or hematuria  NEUROLOGICAL: No numbness or weakness  SKIN: No itching, rashes      PAST MEDICAL & SURGICAL HISTORY    SOCIAL HISTORY:    ALLERGIES:  No Known Allergies    MEDICATIONS:  STANDING MEDICATIONS  atorvastatin Oral Tab/Cap - Peds 40 milliGRAM(s) Oral daily  citalopram 20 milliGRAM(s) Oral daily  clonazePAM  Tablet 0.5 milliGRAM(s) Oral at bedtime  dextrose 10% Bolus 250 milliLiter(s) IV Bolus once  dextrose 5%. 1000 milliLiter(s) IV Continuous <Continuous>  dextrose 50% Injectable 12.5 Gram(s) IV Push once  dextrose 50% Injectable 25 Gram(s) IV Push once  dextrose 50% Injectable 25 Gram(s) IV Push once  donepezil 10 milliGRAM(s) Oral at bedtime  heparin   Injectable 5000 Unit(s) SubCutaneous every 8 hours  insulin lispro (HumaLOG) corrective regimen sliding scale   SubCutaneous three times a day before meals  insulin regular  human recombinant. 10 Unit(s) SubCutaneous once  levothyroxine 50 MICROGram(s) Oral daily    PRN MEDICATIONS  dextrose 40% Gel 15 Gram(s) Oral once PRN  glucagon  Injectable 1 milliGRAM(s) IntraMuscular once PRN  morphine  - Injectable 2 milliGRAM(s) IV Push every 4 hours PRN    VITALS:   T(F): 98.9  HR: 100  BP: 115/62  RR: 20  SpO2: 97%    LABS:  Negative for smoking/alcohol/drug use.                         7.0    7.75  )-----------( 168      ( 2020 06:19 )             22.2     06-11    138  |  105  |  29<H>  ----------------------------<  122<H>  4.4   |  25  |  1.4    Ca    8.3<L>      2020 06:19  Mg     2.0     06-11        Urinalysis Basic - ( 10 Anthony 2020 21:38 )    Color: Light Yellow / Appearance: Clear / S.025 / pH: x  Gluc: x / Ketone: Trace  / Bili: Negative / Urobili: <2 mg/dL   Blood: x / Protein: Trace / Nitrite: Negative   Leuk Esterase: Negative / RBC: x / WBC x   Sq Epi: x / Non Sq Epi: x / Bacteria: x                RADIOLOGY:    PHYSICAL EXAM:  GEN: No acute distress  HEENT: normocephalic, atraumatic, aniceteric  LUNGS: Clear to auscultation bilaterally, no rales/wheezing/ rhonchi  HEART: S1/S2 present. RRR, no murmurs  ABD: Soft, non-tender, non-distended. Bowel sounds present  EXT: NC/NC/NE/2+PP/CABRAL  NEURO: AAOX3, normal affect      ASSESSMENT AND PLAN: SUBJECTIVE:    Patient is a 86y old Male who presents with a chief complaint of Fall (2020 09:37)    Currently admitted to medicine with the primary diagnosis of Fall     Today is hospital day 3d. This morning he is resting comfortably in bed and reports no new issues or overnight events.     ROS:   CONSTITUTIONAL: No weakness, fevers or chills   EYES/ENT: No visual changes; No vertigo or throat pain   NECK: No pain or stiffness   RESPIRATORY: No cough, wheezing, hemoptysis; No shortness of breath   CARDIOVASCULAR: No chest pain or palpitations   GASTROINTESTINAL: No abdominal or epigastric pain. No nausea, vomiting, or hematemesis; No diarrhea or constipation. No melena or hematochezia.  GENITOURINARY: No dysuria, frequency or hematuria  NEUROLOGICAL: No numbness or weakness  SKIN: No itching, rashes      PAST MEDICAL & SURGICAL HISTORY    SOCIAL HISTORY:    ALLERGIES:  No Known Allergies    MEDICATIONS:  STANDING MEDICATIONS  atorvastatin Oral Tab/Cap - Peds 40 milliGRAM(s) Oral daily  citalopram 20 milliGRAM(s) Oral daily  clonazePAM  Tablet 0.5 milliGRAM(s) Oral at bedtime  dextrose 10% Bolus 250 milliLiter(s) IV Bolus once  dextrose 5%. 1000 milliLiter(s) IV Continuous <Continuous>  dextrose 50% Injectable 12.5 Gram(s) IV Push once  dextrose 50% Injectable 25 Gram(s) IV Push once  dextrose 50% Injectable 25 Gram(s) IV Push once  donepezil 10 milliGRAM(s) Oral at bedtime  heparin   Injectable 5000 Unit(s) SubCutaneous every 8 hours  insulin lispro (HumaLOG) corrective regimen sliding scale   SubCutaneous three times a day before meals  insulin regular  human recombinant. 10 Unit(s) SubCutaneous once  levothyroxine 50 MICROGram(s) Oral daily    PRN MEDICATIONS  dextrose 40% Gel 15 Gram(s) Oral once PRN  glucagon  Injectable 1 milliGRAM(s) IntraMuscular once PRN  morphine  - Injectable 2 milliGRAM(s) IV Push every 4 hours PRN    VITALS:   T(F): 98.9  HR: 100  BP: 115/62  RR: 20  SpO2: 97%    LABS:                          7.0    7.75  )-----------( 168      ( 2020 06:19 )             22.2     06-11    138  |  105  |  29<H>  ----------------------------<  122<H>  4.4   |  25  |  1.4    Ca    8.3<L>      2020 06:19  Mg     2.0     06-11        Urinalysis Basic - ( 10 Anthony 2020 21:38 )    Color: Light Yellow / Appearance: Clear / S.025 / pH: x  Gluc: x / Ketone: Trace  / Bili: Negative / Urobili: <2 mg/dL   Blood: x / Protein: Trace / Nitrite: Negative   Leuk Esterase: Negative / RBC: x / WBC x   Sq Epi: x / Non Sq Epi: x / Bacteria: x      RADIOLOGY:   no new today  PHYSICAL EXAM:  GEN: No acute distress  HEENT: normocephalic, atraumatic, aniceteric  LUNGS: Clear to auscultation bilaterally, no rales/wheezing/ rhonchi  HEART: S1/S2 present. RRR, no murmurs  ABD: Soft, non-tender, non-distended. Bowel sounds present  EXT: NC/NC/NE/2+PP/CABRAL  NEURO: AAOX1, normal affect      ASSESSMENT AND PLAN:    Pt is a 86 years old m with PMH  of advanced  alzheimer , mood disorder , HTN , presented to ED  after  being found at nursing home on Right  side on ground this evening. Circumstances of fall are unclear per NH , no downtime known. Pt known to be ambulatory at baseline. Pt only complaining of pain to Right  hip pain. On presentation to ED pt found to have right intertrochanteric fracture . Pt evaluated by orthopedics . Pt will go for ORIF today.    # Right Intertrochanteric fracture s/p unwitnessed fall  - NWB right leg  - pain control  - dvt ppx heparin subq  - ortho f/u  - EKG unremarkable   - TTE on this admission: G1DD, aortic valve thickening, mod pulm HTN    # Acute on chronic blood loss anemia  - likely secondary to surgery  - Hb 7 this am , will give 1 unit and f/u 8 om cbc  - active t&s  - f/u ferritin  (r/o Fe deficiency anemia vs inflammatory reaction)      #Advanced Alzheimers   - continue donepezil , clonazepam and citalopram    # HTN  - continue lisinopril    #Hypothyroidism  - continue synthyroid    #Hypomagnasemia  - Mg 1.6 today, will replete and follow    #Diet: DASh/TLC  # DVT PPX heparin sq  #Dispo :from [facility]    Handoff: acute , f/u cbc 8 pm s/p  1 unit this am

## 2020-06-11 NOTE — PROGRESS NOTE ADULT - SUBJECTIVE AND OBJECTIVE BOX
ORTHO PROGRESS NOTE     86 y o Male POD # 2 S/P R hip ORIF      Patient seen and examined at bedside. No complaints of chest pain, SOB, N/V.    MEDICATIONS  (STANDING):  atorvastatin Oral Tab/Cap - Peds 40 milliGRAM(s) Oral daily  citalopram 20 milliGRAM(s) Oral daily  clonazePAM  Tablet 0.5 milliGRAM(s) Oral at bedtime  dextrose 10% Bolus 250 milliLiter(s) IV Bolus once  dextrose 5%. 1000 milliLiter(s) (50 mL/Hr) IV Continuous <Continuous>  dextrose 50% Injectable 12.5 Gram(s) IV Push once  dextrose 50% Injectable 25 Gram(s) IV Push once  dextrose 50% Injectable 25 Gram(s) IV Push once  donepezil 10 milliGRAM(s) Oral at bedtime  heparin   Injectable 5000 Unit(s) SubCutaneous every 8 hours  insulin lispro (HumaLOG) corrective regimen sliding scale   SubCutaneous three times a day before meals  insulin regular  human recombinant. 10 Unit(s) SubCutaneous once  levothyroxine 50 MICROGram(s) Oral daily    MEDICATIONS  (PRN):  dextrose 40% Gel 15 Gram(s) Oral once PRN Blood Glucose LESS THAN 70 milliGRAM(s)/deciliter  glucagon  Injectable 1 milliGRAM(s) IntraMuscular once PRN Glucose LESS THAN 70 milligrams/deciliter  morphine  - Injectable 2 milliGRAM(s) IV Push every 4 hours PRN Moderate Pain (4 - 6)      Vital Signs Last 24 Hrs  T(C): 37.2 (11 Jun 2020 07:30), Max: 38 (10 Anthony 2020 21:30)  T(F): 98.9 (11 Jun 2020 07:30), Max: 100.4 (10 Anthony 2020 21:30)  HR: 100 (11 Jun 2020 07:30) (89 - 121)  BP: 115/62 (11 Jun 2020 07:30) (110/55 - 129/57)  BP(mean): --  RR: 20 (11 Jun 2020 07:30) (18 - 21)  SpO2: 97% (11 Jun 2020 03:12) (97% - 97%)      PE:   Dressing C/D/I   Motor intact distally  SILT distally  CR<2sec  foot wwp                           7.0    7.75  )-----------( 168      ( 11 Jun 2020 06:19 )             22.2     06-11    138  |  105  |  29<H>  ----------------------------<  122<H>  4.4   |  25  |  1.4    Ca    8.3<L>      11 Jun 2020 06:19  Mg     2.0     06-11              A/P: 86 y o Male POD #2 S/P R hip ORIF, acute blood loss anemia     OOB to Chair   WBAT RLE  PT/OT  monitor cbc, transfuse prn  Pain control   Ext icing/elevation  Incentive Spirometry   DVT Prophylaxis   Discharge planning

## 2020-06-11 NOTE — PROGRESS NOTE ADULT - ATTENDING COMMENTS
Pt. seen/ examined  No pain  Unaware of injury/ surgery  AAOX1 (baseline)  Dressings C/D/I  Labs/ vitals reviewed  Received 1 unit PRBCs  Will follow
Pt. seen/ examined  Not aware of pain or surgery  Dressing C/D/I  RLE: NVID  Labs/ vitals reviewed/ discussed with medicine attending  Discussed DVT Prx - Lovenox  D/C planning
Patient seen and examined independently. Agree with resident note/ history / physical exam and plan of care with following exceptions/additions/updates. Case discussed with patient/pt decision maker, house-staff and nursing.  please see my notes for med  pre op  pt is moderated risk for surgery.

## 2020-06-11 NOTE — PROGRESS NOTE ADULT - SUBJECTIVE AND OBJECTIVE BOX
Pre Op: planned procedure ORIF    HPI:  pt is a 86 years old m with PMH  of advanced  alzheimer , mood disorder , HTN , presented to ED  after  being found at nursing home on Right  side on ground this evening. Circumstances of fall are unclear per NH , no downtime known. Pt known to be ambulatory at baseline. Pt only complaining of pain to Right  hip pain. On presentation to ED pt found to have right intertrochanteric fracture now he's s/p ORIF POD#2    PAST MEDICAL & SURGICAL HISTORY:  alzhimer HTN, Mood d/o, hypothyroidism, hyperlipidemia,     allergy: NKDA    Home Medications:  atorvastatin 40 mg oral tablet: 1 tab(s) orally once a day (08 Jun 2020 22:23)  citalopram 20 mg oral tablet: 1 tab(s) orally once a day (08 Jun 2020 22:23)  clonazePAM 0.5 mg oral tablet: 1 tab(s) orally once a day (at bedtime) (08 Jun 2020 22:23)  donepezil 10 mg oral tablet: 1 tab(s) orally once a day (at bedtime) (08 Jun 2020 22:23)  lisinopril 5 mg oral tablet: 1 tab(s) orally once a day (08 Jun 2020 22:24)  Synthroid 50 mcg (0.05 mg) oral tablet: 1 tab(s) orally once a day (08 Jun 2020 22:24)    MEDICATIONS  (STANDING):  atorvastatin Oral Tab/Cap - Peds 40 milliGRAM(s) Oral daily  citalopram 20 milliGRAM(s) Oral daily  clonazePAM  Tablet 0.5 milliGRAM(s) Oral at bedtime  dextrose 10% Bolus 250 milliLiter(s) IV Bolus once  dextrose 5% + sodium chloride 0.45%. 1000 milliLiter(s) (75 mL/Hr) IV Continuous <Continuous>  dextrose 5%. 1000 milliLiter(s) (50 mL/Hr) IV Continuous <Continuous>  dextrose 50% Injectable 12.5 Gram(s) IV Push once  dextrose 50% Injectable 25 Gram(s) IV Push once  dextrose 50% Injectable 25 Gram(s) IV Push once  donepezil 10 milliGRAM(s) Oral at bedtime  heparin   Injectable 5000 Unit(s) SubCutaneous every 8 hours  insulin lispro (HumaLOG) corrective regimen sliding scale   SubCutaneous three times a day before meals  levothyroxine 50 MICROGram(s) Oral daily    Vital Signs Last 24 Hrs  T(C): 37.2 (11 Jun 2020 07:30), Max: 38 (10 Anthony 2020 21:30)  T(F): 98.9 (11 Jun 2020 07:30), Max: 100.4 (10 Anthony 2020 21:30)  HR: 100 (11 Jun 2020 07:30) (89 - 121)  BP: 115/62 (11 Jun 2020 07:30) (110/55 - 129/57)  RR: 20 (11 Jun 2020 07:30) (18 - 21)  SpO2: 97% (11 Jun 2020 03:12) (97% - 97%)                               7.0    7.75  )-----------( 168      ( 11 Jun 2020 06:19 )             22.2     MCV 71 (LOW)    06-11    138  |  105  |  29<H>  ----------------------------<  122<H>  4.4   |  25  |  1.4    Ca    8.3<L>      11 Jun 2020 06:19  Mg     2.0     06-11      < from: 12 Lead ECG (06.08.20 @ 19:56) >  Ventricular Rate 92 BPM    Atrial Rate 92 BPM    P-R Interval 144 ms    QRS Duration 84 ms    Q-T Interval 356 ms    QTC Calculation(Bezet) 440 ms    P Axis 79 degrees    R Axis -36 degrees    T Axis 63 degrees    Diagnosis Line Sinus rhythm with occasional Premature ventricular complexes  Left axis deviation  Left anterior fascicular block  Abnormal ECG    < end of copied text >    < from: Xray Femur 2 Views, Right (06.09.20 @ 06:19) >  Acute intertrochanteric/subtrochanteric right femoral fracture    < end of copied text >    < from: Xray Chest 1 View-PORTABLE IMMEDIATE (06.08.20 @ 18:49) >  No radiographic evidence of acute cardiopulmonary disease.    < end of copied text >    Assessment and Plan    #acute intratrochanteric and subtrochanteric right femoral fracture due to mechanical fall.   good functional status, as per daughter pt was active and doing pushups prior to fall.   Now pt is s/p Right ORIF POD #2 complicated with Acute Blood Loss Anemia and with evidence of Mild Microcytic Anemia on admission unclear of workup in the past. Today H/H dropped to 7.0 and pt has been on IVF for dehydration and mild MICA. Now improved renal function hence plan as follows:   - Give 1 Unit PRBC over 2-3hrs  - Add on Ferritin Level to pre transfusion labs  - Stop IVF  - c/w PT care routinely  - CM can plan to send pt out tomorrow  - f/u H/H this evening and in am   - f/u Ferritin Level   - Pt will need the rest of Anemia w/up to be completed in the outpatient setting and possible EGD/Colonoscopy for now no evidence of bleeding. No inpatient GI Eval needed. Pt with CKD and Microcytic Anemia, hip fx (mechanical fall) needs to ruled out for Multiple Myeloma.   - DVT Proph x 2wks upon d/c     #ckd, at baseline    #HTN, bp is acceptable for surgery, cont meds, monitor post op   #dyslipidemia. cont meds  #hypothyroidism, cont meds  #mood dx, cont meds  #demenia, supportive care     Social: Daughter aware of current plan of care and d/c plan for tomorrow. D/W CM as well.

## 2020-06-12 LAB
ANION GAP SERPL CALC-SCNC: 9 MMOL/L — SIGNIFICANT CHANGE UP (ref 7–14)
BLD GP AB SCN SERPL QL: SIGNIFICANT CHANGE UP
BUN SERPL-MCNC: 24 MG/DL — HIGH (ref 10–20)
CALCIUM SERPL-MCNC: 8.8 MG/DL — SIGNIFICANT CHANGE UP (ref 8.5–10.1)
CHLORIDE SERPL-SCNC: 108 MMOL/L — SIGNIFICANT CHANGE UP (ref 98–110)
CO2 SERPL-SCNC: 24 MMOL/L — SIGNIFICANT CHANGE UP (ref 17–32)
CREAT SERPL-MCNC: 1.2 MG/DL — SIGNIFICANT CHANGE UP (ref 0.7–1.5)
CULTURE RESULTS: NO GROWTH — SIGNIFICANT CHANGE UP
FERRITIN SERPL-MCNC: 52 NG/ML — SIGNIFICANT CHANGE UP (ref 30–400)
FERRITIN SERPL-MCNC: 65 NG/ML — SIGNIFICANT CHANGE UP (ref 30–400)
GLUCOSE BLDC GLUCOMTR-MCNC: 109 MG/DL — HIGH (ref 70–99)
GLUCOSE BLDC GLUCOMTR-MCNC: 120 MG/DL — HIGH (ref 70–99)
GLUCOSE BLDC GLUCOMTR-MCNC: 128 MG/DL — HIGH (ref 70–99)
GLUCOSE SERPL-MCNC: 120 MG/DL — HIGH (ref 70–99)
HCT VFR BLD CALC: 24.1 % — LOW (ref 42–52)
HCT VFR BLD CALC: 24.3 % — LOW (ref 42–52)
HGB BLD-MCNC: 7.6 G/DL — LOW (ref 14–18)
HGB BLD-MCNC: 7.7 G/DL — LOW (ref 14–18)
MCHC RBC-ENTMCNC: 23.3 PG — LOW (ref 27–31)
MCHC RBC-ENTMCNC: 23.4 PG — LOW (ref 27–31)
MCHC RBC-ENTMCNC: 31.5 G/DL — LOW (ref 32–37)
MCHC RBC-ENTMCNC: 31.7 G/DL — LOW (ref 32–37)
MCV RBC AUTO: 73.9 FL — LOW (ref 80–94)
MCV RBC AUTO: 73.9 FL — LOW (ref 80–94)
NRBC # BLD: 0 /100 WBCS — SIGNIFICANT CHANGE UP (ref 0–0)
NRBC # BLD: 0 /100 WBCS — SIGNIFICANT CHANGE UP (ref 0–0)
PLATELET # BLD AUTO: 171 K/UL — SIGNIFICANT CHANGE UP (ref 130–400)
PLATELET # BLD AUTO: 193 K/UL — SIGNIFICANT CHANGE UP (ref 130–400)
POTASSIUM SERPL-MCNC: 4.4 MMOL/L — SIGNIFICANT CHANGE UP (ref 3.5–5)
POTASSIUM SERPL-SCNC: 4.4 MMOL/L — SIGNIFICANT CHANGE UP (ref 3.5–5)
RBC # BLD: 3.26 M/UL — LOW (ref 4.7–6.1)
RBC # BLD: 3.29 M/UL — LOW (ref 4.7–6.1)
RBC # FLD: 21.6 % — HIGH (ref 11.5–14.5)
RBC # FLD: 21.8 % — HIGH (ref 11.5–14.5)
SODIUM SERPL-SCNC: 141 MMOL/L — SIGNIFICANT CHANGE UP (ref 135–146)
SPECIMEN SOURCE: SIGNIFICANT CHANGE UP
WBC # BLD: 6.66 K/UL — SIGNIFICANT CHANGE UP (ref 4.8–10.8)
WBC # BLD: 7.05 K/UL — SIGNIFICANT CHANGE UP (ref 4.8–10.8)
WBC # FLD AUTO: 6.66 K/UL — SIGNIFICANT CHANGE UP (ref 4.8–10.8)
WBC # FLD AUTO: 7.05 K/UL — SIGNIFICANT CHANGE UP (ref 4.8–10.8)

## 2020-06-12 PROCEDURE — 99233 SBSQ HOSP IP/OBS HIGH 50: CPT

## 2020-06-12 PROCEDURE — 93010 ELECTROCARDIOGRAM REPORT: CPT

## 2020-06-12 RX ORDER — PANTOPRAZOLE SODIUM 20 MG/1
40 TABLET, DELAYED RELEASE ORAL
Refills: 0 | Status: DISCONTINUED | OUTPATIENT
Start: 2020-06-12 | End: 2020-06-14

## 2020-06-12 RX ADMIN — CITALOPRAM 20 MILLIGRAM(S): 10 TABLET, FILM COATED ORAL at 11:27

## 2020-06-12 RX ADMIN — Medication 0.5 MILLIGRAM(S): at 21:59

## 2020-06-12 RX ADMIN — HEPARIN SODIUM 5000 UNIT(S): 5000 INJECTION INTRAVENOUS; SUBCUTANEOUS at 05:44

## 2020-06-12 RX ADMIN — ATORVASTATIN CALCIUM 40 MILLIGRAM(S): 80 TABLET, FILM COATED ORAL at 21:59

## 2020-06-12 RX ADMIN — Medication 50 MICROGRAM(S): at 05:44

## 2020-06-12 RX ADMIN — HEPARIN SODIUM 5000 UNIT(S): 5000 INJECTION INTRAVENOUS; SUBCUTANEOUS at 21:59

## 2020-06-12 RX ADMIN — DONEPEZIL HYDROCHLORIDE 10 MILLIGRAM(S): 10 TABLET, FILM COATED ORAL at 21:59

## 2020-06-12 RX ADMIN — HEPARIN SODIUM 5000 UNIT(S): 5000 INJECTION INTRAVENOUS; SUBCUTANEOUS at 14:40

## 2020-06-12 NOTE — PROGRESS NOTE ADULT - SUBJECTIVE AND OBJECTIVE BOX
SUBJECTIVE:    Patient is a 86y old Male who presents with a chief complaint of Fall (2020 07:33)    Currently admitted to medicine with the primary diagnosis of Fall     Today is hospital day 4d. This morning he is resting comfortably in bed and reports no new issues or overnight events.     ROS:       difficult to obtain as patient does not seem to want to answer questions this morning, however when I asked about the pain he denied abdominal pain , hip pain    PAST MEDICAL & SURGICAL HISTORY      ALLERGIES:  No Known Allergies    MEDICATIONS:  STANDING MEDICATIONS  atorvastatin Oral Tab/Cap - Peds 40 milliGRAM(s) Oral daily  citalopram 20 milliGRAM(s) Oral daily  clonazePAM  Tablet 0.5 milliGRAM(s) Oral at bedtime  dextrose 10% Bolus 250 milliLiter(s) IV Bolus once  dextrose 5%. 1000 milliLiter(s) IV Continuous <Continuous>  dextrose 50% Injectable 12.5 Gram(s) IV Push once  dextrose 50% Injectable 25 Gram(s) IV Push once  dextrose 50% Injectable 25 Gram(s) IV Push once  donepezil 10 milliGRAM(s) Oral at bedtime  heparin   Injectable 5000 Unit(s) SubCutaneous every 8 hours  insulin lispro (HumaLOG) corrective regimen sliding scale   SubCutaneous three times a day before meals  insulin regular  human recombinant. 10 Unit(s) SubCutaneous once  levothyroxine 50 MICROGram(s) Oral daily  pantoprazole    Tablet 40 milliGRAM(s) Oral before breakfast    PRN MEDICATIONS  dextrose 40% Gel 15 Gram(s) Oral once PRN  glucagon  Injectable 1 milliGRAM(s) IntraMuscular once PRN  morphine  - Injectable 2 milliGRAM(s) IV Push every 4 hours PRN    VITALS:   T(F): 98.2  HR: 92  BP: 131/64  RR: 19  SpO2: 95%    LABS:                          7.6    6.66  )-----------( 171      ( 2020 06:57 )             24.1     06-12    141  |  108  |  24<H>  ----------------------------<  120<H>  4.4   |  24  |  1.2    Ca    8.8      2020 06:57  Mg     2.0     06-11    Urinalysis Basic - ( 10 Anthony 2020 21:38 )    Color: Light Yellow / Appearance: Clear / S.025 / pH: x  Gluc: x / Ketone: Trace  / Bili: Negative / Urobili: <2 mg/dL   Blood: x / Protein: Trace / Nitrite: Negative   Leuk Esterase: Negative / RBC: x / WBC x   Sq Epi: x / Non Sq Epi: x / Bacteria: x    RADIOLOGY:    PHYSICAL EXAM:  GEN: No acute distress  HEENT: normocephalic, atraumatic, aniceteric  LUNGS: Clear to auscultation bilaterally, no rales/wheezing/ rhonchi  HEART: S1/S2 present. RRR, no murmurs  ABD: Soft, non-tender, non-distended. Bowel sounds present  EXT: NC/NC/NE/2+PP/CABRAL  NEURO: AAOX1, normal affect      ASSESSMENT AND PLAN:    Pt is a 86 years old m with PMH  of advanced  alzheimer , mood disorder , HTN , presented to ED  after  being found at nursing home on Right  side on ground this evening. Circumstances of fall are unclear per NH , no downtime known. Pt known to be ambulatory at baseline. Pt only complaining of pain to Right  hip pain. On presentation to ED pt found to have right intertrochanteric fracture . Pt evaluated by orthopedics . Pt will go for ORIF today.    # Right Intertrochanteric fracture s/p unwitnessed fall  - NWB right leg  - pain control  - dvt ppx heparin subq  - ortho f/u  - EKG unremarkable   - TTE on this admission: G1DD, aortic valve thickening, mod pulm HTN    # Acute on chronic blood loss anemia  - likely secondary to surgery  - s/p 1 unit prbc yesterday - Hb 7.6 this am   - active t&s  - ferritin wnl  - f/u FOBT  - f/u cbc 4 pm , if continues to drop get CT A/P no contrast r/o retroperitoneal hemorrhage     #Advanced Alzheimers   - continue donepezil , clonazepam and citalopram    # HTN  - continue lisinopril    #Hypothyroidism  - continue synthyroid    #Hypomagnasemia  - Mg 1.6 today, will replete and follow    #Diet: DASh/TLC  # DVT PPX heparin sq  #Dispo :from [facility] SUBJECTIVE:    Patient is a 86y old Male who presents with a chief complaint of Fall (2020 07:33)    Currently admitted to medicine with the primary diagnosis of Fall     Today is hospital day 4d. This morning he is resting comfortably in bed and reports no new issues or overnight events.     ROS:       difficult to obtain as patient does not seem to want to answer questions this morning, however when I asked about the pain he denied abdominal pain , hip pain    PAST MEDICAL & SURGICAL HISTORY      ALLERGIES:  No Known Allergies    MEDICATIONS:  STANDING MEDICATIONS  atorvastatin Oral Tab/Cap - Peds 40 milliGRAM(s) Oral daily  citalopram 20 milliGRAM(s) Oral daily  clonazePAM  Tablet 0.5 milliGRAM(s) Oral at bedtime  dextrose 10% Bolus 250 milliLiter(s) IV Bolus once  dextrose 5%. 1000 milliLiter(s) IV Continuous <Continuous>  dextrose 50% Injectable 12.5 Gram(s) IV Push once  dextrose 50% Injectable 25 Gram(s) IV Push once  dextrose 50% Injectable 25 Gram(s) IV Push once  donepezil 10 milliGRAM(s) Oral at bedtime  heparin   Injectable 5000 Unit(s) SubCutaneous every 8 hours  insulin lispro (HumaLOG) corrective regimen sliding scale   SubCutaneous three times a day before meals  insulin regular  human recombinant. 10 Unit(s) SubCutaneous once  levothyroxine 50 MICROGram(s) Oral daily  pantoprazole    Tablet 40 milliGRAM(s) Oral before breakfast    PRN MEDICATIONS  dextrose 40% Gel 15 Gram(s) Oral once PRN  glucagon  Injectable 1 milliGRAM(s) IntraMuscular once PRN  morphine  - Injectable 2 milliGRAM(s) IV Push every 4 hours PRN    VITALS:   T(F): 98.2  HR: 92  BP: 131/64  RR: 19  SpO2: 95%    LABS:                          7.6    6.66  )-----------( 171      ( 2020 06:57 )             24.1     06-12    141  |  108  |  24<H>  ----------------------------<  120<H>  4.4   |  24  |  1.2    Ca    8.8      2020 06:57  Mg     2.0     06-11    Urinalysis Basic - ( 10 Anthony 2020 21:38 )    Color: Light Yellow / Appearance: Clear / S.025 / pH: x  Gluc: x / Ketone: Trace  / Bili: Negative / Urobili: <2 mg/dL   Blood: x / Protein: Trace / Nitrite: Negative   Leuk Esterase: Negative / RBC: x / WBC x   Sq Epi: x / Non Sq Epi: x / Bacteria: x    RADIOLOGY:    PHYSICAL EXAM:  GEN: No acute distress  HEENT: normocephalic, atraumatic, aniceteric  LUNGS: Clear to auscultation bilaterally, no rales/wheezing/ rhonchi  HEART: S1/S2 present. RRR, no murmurs  ABD: Soft, non-tender, non-distended. Bowel sounds present  EXT: NC/NC/NE/2+PP/CABRAL  NEURO: AAOX1, normal affect      ASSESSMENT AND PLAN:    Pt is a 86 years old m with PMH  of advanced  alzheimer , mood disorder , HTN , presented to ED  after  being found at nursing home on Right  side on ground this evening. Circumstances of fall are unclear per NH , no downtime known. Pt known to be ambulatory at baseline. Pt only complaining of pain to Right  hip pain. On presentation to ED pt found to have right intertrochanteric fracture . Pt evaluated by orthopedics . Pt will go for ORIF today.    # Right Intertrochanteric fracture s/p unwitnessed fall  - NWB right leg  - pain control  - dvt ppx heparin subq  - ortho f/u  - EKG unremarkable   - TTE on this admission: G1DD, aortic valve thickening, mod pulm HTN    # Microcytic Anemia complicated w/ Acute Blood Loss Anemia (post op anemia)  - s/p 1 unit prbc yesterday - Hb 7.6 this am   - active t&s  - ferritin wnl  - f/u FOBT  - f/u cbc 4 pm , if continues to drop get CT A/P no contrast r/o retroperitoneal hemorrhage     #Advanced Alzheimers   - continue donepezil , clonazepam and citalopram    # HTN  - continue lisinopril    #Hypothyroidism  - continue synthyroid    #Hypomagnasemia  - Mg 2.0 today    #Diet: DASh/TLC  # DVT PPX heparin sq  #Dispo: from er NH    Attending Attestation    Pt has been seen and examined. Case and Plan discussed at rounds and agree with above documentation as reviewed.   Microcytic Anemia complicated by Acute Blood Loss Anemia (from Surgery)   - Ferritin WNL (before Transfusion)   - Rule out MM, Age Appropriate Screening for Malignancy, Hemoglobinopathies like Thalasemias  - Monitor H/H if stable can be d/[facility] to [facility] for Fall complicated by Right Hip Fx s/p ORIF POD#3   - Check fecal occult blood before d/c   - If H/H drops - check CT A/P none contrast to r/o RPH  - Check COVID19 PCR for [facility] d/c plans     Dispo: Possible d/c tomorrow depending on above to [facility]

## 2020-06-12 NOTE — PROGRESS NOTE ADULT - SUBJECTIVE AND OBJECTIVE BOX
ORTHO PROGRESS NOTE     86yMale POD # 3  S/P R hip ORIF with IMN      Patient seen and examined at bedside . No issues overnight. No complaints of chest pain, SOB, N/V.    MEDICATIONS  (STANDING):  atorvastatin Oral Tab/Cap - Peds 40 milliGRAM(s) Oral daily  citalopram 20 milliGRAM(s) Oral daily  clonazePAM  Tablet 0.5 milliGRAM(s) Oral at bedtime  dextrose 10% Bolus 250 milliLiter(s) IV Bolus once  dextrose 5%. 1000 milliLiter(s) (50 mL/Hr) IV Continuous <Continuous>  dextrose 50% Injectable 12.5 Gram(s) IV Push once  dextrose 50% Injectable 25 Gram(s) IV Push once  dextrose 50% Injectable 25 Gram(s) IV Push once  donepezil 10 milliGRAM(s) Oral at bedtime  heparin   Injectable 5000 Unit(s) SubCutaneous every 8 hours  insulin lispro (HumaLOG) corrective regimen sliding scale   SubCutaneous three times a day before meals  insulin regular  human recombinant. 10 Unit(s) SubCutaneous once  levothyroxine 50 MICROGram(s) Oral daily    MEDICATIONS  (PRN):  dextrose 40% Gel 15 Gram(s) Oral once PRN Blood Glucose LESS THAN 70 milliGRAM(s)/deciliter  glucagon  Injectable 1 milliGRAM(s) IntraMuscular once PRN Glucose LESS THAN 70 milligrams/deciliter  morphine  - Injectable 2 milliGRAM(s) IV Push every 4 hours PRN Moderate Pain (4 - 6)      Vital Signs Last 24 Hrs  T(C): 36.1 (2020 03:45), Max: 37.4 (2020 15:34)  T(F): 97 (2020 03:45), Max: 99.4 (2020 15:34)  HR: 100 (2020 03:45) (94 - 101)  BP: 129/65 (2020 03:45) (116/58 - 129/65)  BP(mean): --  RR: 20 (2020 03:45) (20 - 20)  SpO2: 95% (2020 15:34) (95% - 95%)    PE:   Dressing C/D/I   Compartments soft and compressible  Motor intact distally  CR<2sec  palpable pulses                       7.6    6.66  )-----------( 171      ( 2020 06:57 )             24.1         138  |  105  |  29<H>  ----------------------------<  122<H>  4.4   |  25  |  1.4    Ca    8.3<L>      2020 06:19  Mg     2.0         Urinalysis Basic - ( 10 Anthony 2020 21:38 )    Color: Light Yellow / Appearance: Clear / S.025 / pH: x  Gluc: x / Ketone: Trace  / Bili: Negative / Urobili: <2 mg/dL   Blood: x / Protein: Trace / Nitrite: Negative   Leuk Esterase: Negative / RBC: x / WBC x   Sq Epi: x / Non Sq Epi: x / Bacteria: x        20 @ 07:01  -  20 @ 07:00  --------------------------------------------------------  IN: 261 mL / OUT: 0 mL / NET: 261 mL      A/P: 86yMale POD # 3 S/P R hip ORIF with IMN, doing well.   OOB to Chair   WBAT RLE   Transfuse as necessary   PT/OT  Pain control   Ext icing/elevation  Incentive Spirometry   DVT Prophylaxis   Discharge planning

## 2020-06-13 LAB
APPEARANCE UR: CLEAR — SIGNIFICANT CHANGE UP
BASOPHILS # BLD AUTO: 0.01 K/UL — SIGNIFICANT CHANGE UP (ref 0–0.2)
BASOPHILS NFR BLD AUTO: 0.2 % — SIGNIFICANT CHANGE UP (ref 0–1)
BILIRUB UR-MCNC: NEGATIVE — SIGNIFICANT CHANGE UP
COLOR SPEC: SIGNIFICANT CHANGE UP
DIFF PNL FLD: NEGATIVE — SIGNIFICANT CHANGE UP
EOSINOPHIL # BLD AUTO: 0.13 K/UL — SIGNIFICANT CHANGE UP (ref 0–0.7)
EOSINOPHIL NFR BLD AUTO: 2.1 % — SIGNIFICANT CHANGE UP (ref 0–8)
GLUCOSE BLDC GLUCOMTR-MCNC: 103 MG/DL — HIGH (ref 70–99)
GLUCOSE BLDC GLUCOMTR-MCNC: 111 MG/DL — HIGH (ref 70–99)
GLUCOSE BLDC GLUCOMTR-MCNC: 114 MG/DL — HIGH (ref 70–99)
GLUCOSE BLDC GLUCOMTR-MCNC: 121 MG/DL — HIGH (ref 70–99)
GLUCOSE UR QL: NEGATIVE — SIGNIFICANT CHANGE UP
HCT VFR BLD CALC: 25.4 % — LOW (ref 42–52)
HGB BLD-MCNC: 8.1 G/DL — LOW (ref 14–18)
IMM GRANULOCYTES NFR BLD AUTO: 0.7 % — HIGH (ref 0.1–0.3)
KETONES UR-MCNC: NEGATIVE — SIGNIFICANT CHANGE UP
LEUKOCYTE ESTERASE UR-ACNC: NEGATIVE — SIGNIFICANT CHANGE UP
LYMPHOCYTES # BLD AUTO: 0.97 K/UL — LOW (ref 1.2–3.4)
LYMPHOCYTES # BLD AUTO: 15.8 % — LOW (ref 20.5–51.1)
MCHC RBC-ENTMCNC: 23.9 PG — LOW (ref 27–31)
MCHC RBC-ENTMCNC: 31.9 G/DL — LOW (ref 32–37)
MCV RBC AUTO: 74.9 FL — LOW (ref 80–94)
MONOCYTES # BLD AUTO: 0.75 K/UL — HIGH (ref 0.1–0.6)
MONOCYTES NFR BLD AUTO: 12.2 % — HIGH (ref 1.7–9.3)
NEUTROPHILS # BLD AUTO: 4.25 K/UL — SIGNIFICANT CHANGE UP (ref 1.4–6.5)
NEUTROPHILS NFR BLD AUTO: 69 % — SIGNIFICANT CHANGE UP (ref 42.2–75.2)
NITRITE UR-MCNC: NEGATIVE — SIGNIFICANT CHANGE UP
NRBC # BLD: 0 /100 WBCS — SIGNIFICANT CHANGE UP (ref 0–0)
OB PNL STL: POSITIVE
PH UR: 5.5 — SIGNIFICANT CHANGE UP (ref 5–8)
PLATELET # BLD AUTO: 200 K/UL — SIGNIFICANT CHANGE UP (ref 130–400)
PROT UR-MCNC: SIGNIFICANT CHANGE UP
RBC # BLD: 3.39 M/UL — LOW (ref 4.7–6.1)
RBC # FLD: 21.8 % — HIGH (ref 11.5–14.5)
SARS-COV-2 RNA SPEC QL NAA+PROBE: SIGNIFICANT CHANGE UP
SP GR SPEC: 1.02 — SIGNIFICANT CHANGE UP (ref 1.01–1.02)
UROBILINOGEN FLD QL: SIGNIFICANT CHANGE UP
WBC # BLD: 6.15 K/UL — SIGNIFICANT CHANGE UP (ref 4.8–10.8)
WBC # FLD AUTO: 6.15 K/UL — SIGNIFICANT CHANGE UP (ref 4.8–10.8)

## 2020-06-13 PROCEDURE — 99233 SBSQ HOSP IP/OBS HIGH 50: CPT

## 2020-06-13 PROCEDURE — 93970 EXTREMITY STUDY: CPT | Mod: 26

## 2020-06-13 PROCEDURE — 71045 X-RAY EXAM CHEST 1 VIEW: CPT | Mod: 26

## 2020-06-13 RX ORDER — ACETAMINOPHEN 500 MG
650 TABLET ORAL EVERY 6 HOURS
Refills: 0 | Status: DISCONTINUED | OUTPATIENT
Start: 2020-06-13 | End: 2020-06-14

## 2020-06-13 RX ORDER — ACETAMINOPHEN 500 MG
650 TABLET ORAL ONCE
Refills: 0 | Status: COMPLETED | OUTPATIENT
Start: 2020-06-13 | End: 2020-06-13

## 2020-06-13 RX ADMIN — ATORVASTATIN CALCIUM 40 MILLIGRAM(S): 80 TABLET, FILM COATED ORAL at 21:11

## 2020-06-13 RX ADMIN — Medication 0.5 MILLIGRAM(S): at 21:10

## 2020-06-13 RX ADMIN — HEPARIN SODIUM 5000 UNIT(S): 5000 INJECTION INTRAVENOUS; SUBCUTANEOUS at 13:00

## 2020-06-13 RX ADMIN — Medication 650 MILLIGRAM(S): at 16:59

## 2020-06-13 RX ADMIN — HEPARIN SODIUM 5000 UNIT(S): 5000 INJECTION INTRAVENOUS; SUBCUTANEOUS at 05:54

## 2020-06-13 RX ADMIN — HEPARIN SODIUM 5000 UNIT(S): 5000 INJECTION INTRAVENOUS; SUBCUTANEOUS at 21:11

## 2020-06-13 RX ADMIN — CITALOPRAM 20 MILLIGRAM(S): 10 TABLET, FILM COATED ORAL at 11:42

## 2020-06-13 RX ADMIN — PANTOPRAZOLE SODIUM 40 MILLIGRAM(S): 20 TABLET, DELAYED RELEASE ORAL at 08:07

## 2020-06-13 RX ADMIN — DONEPEZIL HYDROCHLORIDE 10 MILLIGRAM(S): 10 TABLET, FILM COATED ORAL at 21:10

## 2020-06-13 RX ADMIN — Medication 650 MILLIGRAM(S): at 00:21

## 2020-06-13 RX ADMIN — Medication 50 MICROGRAM(S): at 05:53

## 2020-06-13 NOTE — PROVIDER CONTACT NOTE (MEDICATION) - DATE AND TIME:
Nursing Note by Gertrudis Buck RN at 17 12:34 PM     Author:  Gertrudis Buck RN Service:  (none) Author Type:  Registered Nurse     Filed:  17 12:35 PM Encounter Date:  2017 Status:  Signed     :  Gertrudis Buck RN (Registered Nurse)            12:34 PM  Chief Complaint     Patient presents with     • Sinus Problem      x 1 wk c/o sinus pressure, headache, bilateral ear pain, chills, chest congestion and cough     Patient brought to exam room.  Electronically Signed by:    Gertrudis Buck RN , 2017  Patient's name,  and allergies verified with[LG1.1T] pt[LG1.1M].  Last visit with [facility] was on 2017 at  9:25 AM in [facility] SEQ  Last visit with [facility] was on 2017 at  9:25 AM in [facility] SEQ  Match done based on reference date of today 17  Lydia Norton was offered treatment for pain control:[LG1.1T] No.   Wait for MD to assess.[LG1.1M]        Revision History        User Key Date/Time User Provider Type Action    > LG1.1 17 12:35 PM Gertrudis Buck RN Registered Nurse Sign    M - Manual, T - Template             13-Jun-2020 00:10

## 2020-06-13 NOTE — PROGRESS NOTE ADULT - SUBJECTIVE AND OBJECTIVE BOX
ORTHO PROGRESS NOTE     86M POD #4  S/P R hip ORIF with IMN      Patient seen and examined at bedside . No issues overnight. No complaints of chest pain, SOB, N/V.    MEDICATIONS  (STANDING):  atorvastatin Oral Tab/Cap - Peds 40 milliGRAM(s) Oral daily  citalopram 20 milliGRAM(s) Oral daily  clonazePAM  Tablet 0.5 milliGRAM(s) Oral at bedtime  dextrose 10% Bolus 250 milliLiter(s) IV Bolus once  dextrose 5%. 1000 milliLiter(s) (50 mL/Hr) IV Continuous <Continuous>  dextrose 50% Injectable 12.5 Gram(s) IV Push once  dextrose 50% Injectable 25 Gram(s) IV Push once  dextrose 50% Injectable 25 Gram(s) IV Push once  donepezil 10 milliGRAM(s) Oral at bedtime  heparin   Injectable 5000 Unit(s) SubCutaneous every 8 hours  insulin lispro (HumaLOG) corrective regimen sliding scale   SubCutaneous three times a day before meals  insulin regular  human recombinant. 10 Unit(s) SubCutaneous once  levothyroxine 50 MICROGram(s) Oral daily  pantoprazole    Tablet 40 milliGRAM(s) Oral before breakfast    MEDICATIONS  (PRN):  dextrose 40% Gel 15 Gram(s) Oral once PRN Blood Glucose LESS THAN 70 milliGRAM(s)/deciliter  glucagon  Injectable 1 milliGRAM(s) IntraMuscular once PRN Glucose LESS THAN 70 milligrams/deciliter  morphine  - Injectable 2 milliGRAM(s) IV Push every 4 hours PRN Moderate Pain (4 - 6)    Physical exam  Vital Signs Last 24 Hrs  T(C): 36.1 (13 Jun 2020 07:50), Max: 38 (13 Jun 2020 00:00)  T(F): 97 (13 Jun 2020 07:50), Max: 100.4 (13 Jun 2020 00:00)  HR: 88 (13 Jun 2020 07:50) (88 - 97)  BP: 131/78 (13 Jun 2020 07:50) (124/58 - 131/78)  BP(mean): --  RR: 20 (13 Jun 2020 07:50) (19 - 20)  SpO2: --     Dressing C/D/I   Compartments soft and compressible  Motor intact distally  CR<2sec  palpable pulses                           8.1    6.15  )-----------( 200      ( 13 Jun 2020 06:44 )             25.4     06-12    141  |  108  |  24<H>  ----------------------------<  120<H>  4.4   |  24  |  1.2    Ca    8.8      12 Jun 2020 06:57        Assessment/Plan:   86M POD#4 S/P R hip ORIF with IMN, doing well.     OOB to Chair   WBAT RLE   Transfuse as necessary   PT/OT  Pain control   Ext icing/elevation  Incentive Spirometry   DVT Prophylaxis   Discharge planning

## 2020-06-13 NOTE — PROGRESS NOTE ADULT - SUBJECTIVE AND OBJECTIVE BOX
SUBJECTIVE:    Patient is a 86y old Male who presents with a chief complaint of Fall (12 Jun 2020 12:42)    Currently admitted to medicine with the primary diagnosis of Fall     Today is hospital day 5d. This morning he is resting comfortably in bed and reports no new issues or overnight events.     Interval events: the patient had low grade fever of 100.4 overnight.     ROS:     difficult to obtain due to patient's mental status. however he denied pain, cough, and shortness of breath.         ALLERGIES:  No Known Allergies    MEDICATIONS:  STANDING MEDICATIONS  atorvastatin Oral Tab/Cap - Peds 40 milliGRAM(s) Oral daily  citalopram 20 milliGRAM(s) Oral daily  clonazePAM  Tablet 0.5 milliGRAM(s) Oral at bedtime  dextrose 10% Bolus 250 milliLiter(s) IV Bolus once  dextrose 5%. 1000 milliLiter(s) IV Continuous <Continuous>  dextrose 50% Injectable 12.5 Gram(s) IV Push once  dextrose 50% Injectable 25 Gram(s) IV Push once  dextrose 50% Injectable 25 Gram(s) IV Push once  donepezil 10 milliGRAM(s) Oral at bedtime  heparin   Injectable 5000 Unit(s) SubCutaneous every 8 hours  insulin lispro (HumaLOG) corrective regimen sliding scale   SubCutaneous three times a day before meals  insulin regular  human recombinant. 10 Unit(s) SubCutaneous once  levothyroxine 50 MICROGram(s) Oral daily  pantoprazole    Tablet 40 milliGRAM(s) Oral before breakfast    PRN MEDICATIONS  dextrose 40% Gel 15 Gram(s) Oral once PRN  glucagon  Injectable 1 milliGRAM(s) IntraMuscular once PRN  morphine  - Injectable 2 milliGRAM(s) IV Push every 4 hours PRN    VITALS:   T(F): 97  HR: 88  BP: 131/78  RR: 20  SpO2: --    LABS:                          8.1    6.15  )-----------( 200      ( 13 Jun 2020 06:44 )             25.4     06-12    141  |  108  |  24<H>  ----------------------------<  120<H>  4.4   |  24  |  1.2    Ca    8.8      12 Jun 2020 06:57    Culture - Blood (collected 11 Jun 2020 00:20)  Source: .Blood None  Preliminary Report (12 Jun 2020 13:01):    No growth to date.    Culture - Urine (collected 10 Anthony 2020 21:38)  Source: .Urine Clean Catch (Midstream)  Final Report (12 Jun 2020 15:45):    No growth      RADIOLOGY:    PHYSICAL EXAM:  GEN: No acute distress  HEENT: normocephalic, atraumatic, aniceteric  LUNGS: Clear to auscultation bilaterally, no rales/wheezing/ rhonchi  HEART: S1/S2 present. RRR, no murmurs  ABD: Soft, non-tender, non-distended. Bowel sounds present  EXT: NC/NC/NE/2+PP/CABRAL  NEURO: AAOX1, normal affect      ASSESSMENT AND PLAN:    # Right Intertrochanteric fracture s/p unwitnessed fall  - NWB right leg  - pain control  - dvt ppx heparin subq  - ortho f/u  - EKG unremarkable   - TTE on this admission: G1DD, aortic valve thickening, mod pulm HTN    # Microcytic Anemia complicated w/ Acute Blood Loss Anemia - stable now  - s/p 1 unit prbc  - Hb stable  - active t&s  - ferritin wnl  - f/u FOBT - pending (collected)    #Fever   r/o DVT vs infectious cause  - B/L LE Duplex f/u  - CXR f/u , U/A f/u  - no Abx for now, will send blood culture if spikes another fever     #Advanced Alzheimers   - continue donepezil , clonazepam and citalopram    # HTN  - continue lisinopril    #Hypothyroidism  - continue synthyroid    #Hypomagnasemia  - Mg 2.0 today    #Diet: DASH/TLC  #DVT PPX heparin sq  #Dispo: from [facility]      Handoff: LE Duplex f/u, CXR f/u , U/A f/u SUBJECTIVE:    Patient is a 86y old Male who presents with a chief complaint of Fall (12 Jun 2020 12:42)    Currently admitted to medicine with the primary diagnosis of Fall     Today is hospital day 5d. This morning he is resting comfortably in bed and reports no new issues or overnight events.     Interval events: the patient had low grade fever of 100.4 overnight.     ROS:     difficult to obtain due to patient's mental status. however he denied pain, cough, and shortness of breath.         ALLERGIES:  No Known Allergies    MEDICATIONS:  STANDING MEDICATIONS  atorvastatin Oral Tab/Cap - Peds 40 milliGRAM(s) Oral daily  citalopram 20 milliGRAM(s) Oral daily  clonazePAM  Tablet 0.5 milliGRAM(s) Oral at bedtime  dextrose 10% Bolus 250 milliLiter(s) IV Bolus once  dextrose 5%. 1000 milliLiter(s) IV Continuous <Continuous>  dextrose 50% Injectable 12.5 Gram(s) IV Push once  dextrose 50% Injectable 25 Gram(s) IV Push once  dextrose 50% Injectable 25 Gram(s) IV Push once  donepezil 10 milliGRAM(s) Oral at bedtime  heparin   Injectable 5000 Unit(s) SubCutaneous every 8 hours  insulin lispro (HumaLOG) corrective regimen sliding scale   SubCutaneous three times a day before meals  insulin regular  human recombinant. 10 Unit(s) SubCutaneous once  levothyroxine 50 MICROGram(s) Oral daily  pantoprazole    Tablet 40 milliGRAM(s) Oral before breakfast    PRN MEDICATIONS  dextrose 40% Gel 15 Gram(s) Oral once PRN  glucagon  Injectable 1 milliGRAM(s) IntraMuscular once PRN  morphine  - Injectable 2 milliGRAM(s) IV Push every 4 hours PRN    VITALS:   T(F): 97  HR: 88  BP: 131/78  RR: 20  SpO2: --    LABS:                          8.1    6.15  )-----------( 200      ( 13 Jun 2020 06:44 )             25.4     06-12    141  |  108  |  24<H>  ----------------------------<  120<H>  4.4   |  24  |  1.2    Ca    8.8      12 Jun 2020 06:57    Culture - Blood (collected 11 Jun 2020 00:20)  Source: .Blood None  Preliminary Report (12 Jun 2020 13:01):    No growth to date.    Culture - Urine (collected 10 Anthony 2020 21:38)  Source: .Urine Clean Catch (Midstream)  Final Report (12 Jun 2020 15:45):    No growth      RADIOLOGY:    PHYSICAL EXAM:  GEN: No acute distress  HEENT: normocephalic, atraumatic, aniceteric  LUNGS: Clear to auscultation bilaterally, no rales/wheezing/ rhonchi  HEART: S1/S2 present. RRR, no murmurs  ABD: Soft, non-tender, non-distended. Bowel sounds present  EXT: NC/NC/NE/2+PP/CABRAL  NEURO: AAOX1, normal affect      ASSESSMENT AND PLAN:    # Right Intertrochanteric fracture s/p unwitnessed fall  - NWB right leg  - pain control  - dvt ppx heparin subq  - ortho f/u  - EKG unremarkable   - TTE on this admission: G1DD, aortic valve thickening, mod pulm HTN    # Microcytic Anemia complicated w/ Acute Blood Loss Anemia - stable now  - s/p 1 unit prbc  - Hb stable  - active t&s  - ferritin wnl  - f/u FOBT - pending (collected)    #Fever   r/o DVT vs infectious cause  - COVID 19 negative  - B/L LE Duplex f/u  - CXR f/u , U/A f/u  - no Abx for now, will send blood culture if spikes another fever     #Advanced Alzheimers   - continue donepezil , clonazepam and citalopram    # HTN  - continue lisinopril    #Hypothyroidism  - continue synthyroid    #Hypomagnasemia  - Mg 2.0 today    #Diet: DASH/TLC  #DVT PPX heparin sq  #Dispo: from [facility]      Handoff: LE Duplex f/u, CXR f/u , U/A f/u SUBJECTIVE:    Patient is a 86y old Male who presents with a chief complaint of Fall (12 Jun 2020 12:42)    Currently admitted to medicine with the primary diagnosis of Fall     Today is hospital day 5d. This morning he is resting comfortably in bed and reports no new issues or overnight events.     Interval events: the patient had low grade fever of 100.4 overnight.     ROS:   difficult to obtain due to patient's mental status. however he denied pain, cough, and shortness of breath.     ALLERGIES:  No Known Allergies    MEDICATIONS:  STANDING MEDICATIONS  atorvastatin Oral Tab/Cap - Peds 40 milliGRAM(s) Oral daily  citalopram 20 milliGRAM(s) Oral daily  clonazePAM  Tablet 0.5 milliGRAM(s) Oral at bedtime  dextrose 10% Bolus 250 milliLiter(s) IV Bolus once  dextrose 5%. 1000 milliLiter(s) IV Continuous <Continuous>  dextrose 50% Injectable 12.5 Gram(s) IV Push once  dextrose 50% Injectable 25 Gram(s) IV Push once  dextrose 50% Injectable 25 Gram(s) IV Push once  donepezil 10 milliGRAM(s) Oral at bedtime  heparin   Injectable 5000 Unit(s) SubCutaneous every 8 hours  insulin lispro (HumaLOG) corrective regimen sliding scale   SubCutaneous three times a day before meals  insulin regular  human recombinant. 10 Unit(s) SubCutaneous once  levothyroxine 50 MICROGram(s) Oral daily  pantoprazole    Tablet 40 milliGRAM(s) Oral before breakfast    PRN MEDICATIONS  dextrose 40% Gel 15 Gram(s) Oral once PRN  glucagon  Injectable 1 milliGRAM(s) IntraMuscular once PRN  morphine  - Injectable 2 milliGRAM(s) IV Push every 4 hours PRN    VITALS:   T(F): 97  HR: 88  BP: 131/78  RR: 20    LABS:                          8.1    6.15  )-----------( 200      ( 13 Jun 2020 06:44 )             25.4     06-12    141  |  108  |  24<H>  ----------------------------<  120<H>  4.4   |  24  |  1.2    Ca    8.8      12 Jun 2020 06:57    Culture - Blood (collected 11 Jun 2020 00:20)  Source: .Blood None  Preliminary Report (12 Jun 2020 13:01):    No growth to date.    Culture - Urine (collected 10 Anthony 2020 21:38)  Source: .Urine Clean Catch (Midstream)  Final Report (12 Jun 2020 15:45):    No growth    PHYSICAL EXAM:  GEN: No acute distress  HEENT: normocephalic, atraumatic, aniceteric  LUNGS: Clear to auscultation bilaterally, no rales/wheezing/ rhonchi  HEART: S1/S2 present. RRR, no murmurs  ABD: Soft, non-tender, non-distended. Bowel sounds present  EXT: NC/NC/NE/2+PP/CABRAL  NEURO: AAOX1, normal affect      ASSESSMENT AND PLAN:    # Right Intertrochanteric fracture s/p unwitnessed fall  - NWB right leg  - pain control  - dvt ppx heparin subq  - ortho f/u  - EKG unremarkable   - TTE on this admission: G1DD, aortic valve thickening, mod pulm HTN    # Microcytic Anemia complicated w/ Acute Blood Loss Anemia - stable now  - s/p 1 unit prbc  - Hb stable  - active t&s  - ferritin wnl  - f/u FOBT - pending (collected)    #Fever   r/o DVT vs infectious cause  - COVID 19 negative  - B/L LE Duplex f/u  - CXR f/u , U/A f/u  - no Abx for now, will send blood culture if spikes another fever     #Advanced Alzheimers   - continue donepezil , clonazepam and citalopram    # HTN  - continue lisinopril    #Hypothyroidism  - continue synthyroid    #Hypomagnasemia  - Mg 2.0 today    #Diet: DASH/TLC  #DVT PPX heparin sq  #Dispo: from [facility]    Handoff: LE Duplex f/u, CXR f/u , U/A f/u    Attending Attestation    Pt has been seen and examined. Case and Plan discussed at rounds and agree with above documentation as reviewed.   In addition to the following:   Pt w/ low grade fevers 100.6. 100.4 will keep on observation for today and do UA, CXR, Duplex bilateral legs. If spikes 101 and above check blood cxs otherwise no need for blood cxs at this time. No abx at this time unless infection and etiology found.   Check CBC for tomorrow     Dx: Dementia p/w Fall w/ Rt Hip Fx s/p ORIF POD#4 complicated w/ Acute Blood Loss Anemia on Chronic Microcytic Anemia - Needs chronic anemia w/up as an outpt by age appropriate screening for malignancy, r/o MM, r/o MGUS r/o Thalasemias or other hemogloglobinopathies      Dispo: [facility] / Worked w/ PT better today -took a few steps / f/u fever curve and cbc in am SUBJECTIVE:    Patient is a 86y old Male who presents with a chief complaint of Fall (12 Jun 2020 12:42)    Currently admitted to medicine with the primary diagnosis of Fall     Today is hospital day 5d. This morning he is resting comfortably in bed and reports no new issues or overnight events.     Interval events: the patient had low grade fever of 100.4 overnight.     ROS:   difficult to obtain due to patient's mental status. however he denied pain, cough, and shortness of breath.     ALLERGIES:  No Known Allergies    MEDICATIONS:  STANDING MEDICATIONS  atorvastatin Oral Tab/Cap - Peds 40 milliGRAM(s) Oral daily  citalopram 20 milliGRAM(s) Oral daily  clonazePAM  Tablet 0.5 milliGRAM(s) Oral at bedtime  dextrose 10% Bolus 250 milliLiter(s) IV Bolus once  dextrose 5%. 1000 milliLiter(s) IV Continuous <Continuous>  dextrose 50% Injectable 12.5 Gram(s) IV Push once  dextrose 50% Injectable 25 Gram(s) IV Push once  dextrose 50% Injectable 25 Gram(s) IV Push once  donepezil 10 milliGRAM(s) Oral at bedtime  heparin   Injectable 5000 Unit(s) SubCutaneous every 8 hours  insulin lispro (HumaLOG) corrective regimen sliding scale   SubCutaneous three times a day before meals  insulin regular  human recombinant. 10 Unit(s) SubCutaneous once  levothyroxine 50 MICROGram(s) Oral daily  pantoprazole    Tablet 40 milliGRAM(s) Oral before breakfast    PRN MEDICATIONS  dextrose 40% Gel 15 Gram(s) Oral once PRN  glucagon  Injectable 1 milliGRAM(s) IntraMuscular once PRN  morphine  - Injectable 2 milliGRAM(s) IV Push every 4 hours PRN    VITALS:   T(F): 97  HR: 88  BP: 131/78  RR: 20    LABS:                          8.1    6.15  )-----------( 200      ( 13 Jun 2020 06:44 )             25.4     06-12    141  |  108  |  24<H>  ----------------------------<  120<H>  4.4   |  24  |  1.2    Ca    8.8      12 Jun 2020 06:57    Culture - Blood (collected 11 Jun 2020 00:20)  Source: .Blood None  Preliminary Report (12 Jun 2020 13:01):    No growth to date.    Culture - Urine (collected 10 Anthony 2020 21:38)  Source: .Urine Clean Catch (Midstream)  Final Report (12 Jun 2020 15:45):    No growth    PHYSICAL EXAM:  GEN: No acute distress  HEENT: normocephalic, atraumatic, aniceteric  LUNGS: Clear to auscultation bilaterally, no rales/wheezing/ rhonchi  HEART: S1/S2 present. RRR, no murmurs  ABD: Soft, non-tender, non-distended. Bowel sounds present  EXT: NC/NC/NE/2+PP/CABRAL  NEURO: AAOX1, normal affect      ASSESSMENT AND PLAN:    # Right Intertrochanteric fracture s/p unwitnessed fall  - NWB right leg  - pain control  - dvt ppx heparin subq  - ortho f/u  - EKG unremarkable   - TTE on this admission: G1DD, aortic valve thickening, mod pulm HTN    # Microcytic Anemia complicated w/ Acute Blood Loss Anemia - stable now  - s/p 1 unit prbc  - Hb stable  - active t&s  - ferritin wnl  - positive FOBT  : no melena, hemodynamically stable ; discussed with attending - patient to do age appropriate screening outpatient    #Fever   r/o DVT vs infectious cause  - COVID 19 negative  - B/L LE Duplex f/u  - CXR f/u , U/A f/u  - no Abx for now, will send blood culture if spikes another fever     #Advanced Alzheimers   - continue donepezil , clonazepam and citalopram    # HTN  - continue lisinopril    #Hypothyroidism  - continue synthyroid    #Hypomagnasemia  - Mg 2.0 today    #Diet: DASH/TLC  #DVT PPX heparin sq  #Dispo: from [facility]    Handoff: LE Duplex f/u, CXR f/u , U/A f/u    Attending Attestation    Pt has been seen and examined. Case and Plan discussed at rounds and agree with above documentation as reviewed.   In addition to the following:   Pt w/ low grade fevers 100.6. 100.4 will keep on observation for today and do UA, CXR, Duplex bilateral legs. If spikes 101 and above check blood cxs otherwise no need for blood cxs at this time. No abx at this time unless infection and etiology found.   Check CBC for tomorrow     Dx: Dementia p/w Fall w/ Rt Hip Fx s/p ORIF POD#4 complicated w/ Acute Blood Loss Anemia on Chronic Microcytic Anemia - Needs chronic anemia w/up as an outpt by age appropriate screening for malignancy, r/o MM, r/o MGUS r/o Thalasemias or other hemogloglobinopathies      Dispo: [facility] / Worked w/ PT better today -took a few steps / f/u fever curve and cbc in am

## 2020-06-14 ENCOUNTER — TRANSCRIPTION ENCOUNTER (OUTPATIENT)
Age: 85
End: 2020-06-14

## 2020-06-14 VITALS
DIASTOLIC BLOOD PRESSURE: 73 MMHG | SYSTOLIC BLOOD PRESSURE: 145 MMHG | TEMPERATURE: 97 F | RESPIRATION RATE: 18 BRPM | HEART RATE: 79 BPM | OXYGEN SATURATION: 100 %

## 2020-06-14 DIAGNOSIS — J69.0 PNEUMONITIS DUE TO INHALATION OF FOOD AND VOMIT: ICD-10-CM

## 2020-06-14 LAB
ANION GAP SERPL CALC-SCNC: 10 MMOL/L — SIGNIFICANT CHANGE UP (ref 7–14)
BASOPHILS # BLD AUTO: 0.01 K/UL — SIGNIFICANT CHANGE UP (ref 0–0.2)
BASOPHILS NFR BLD AUTO: 0.2 % — SIGNIFICANT CHANGE UP (ref 0–1)
BUN SERPL-MCNC: 28 MG/DL — HIGH (ref 10–20)
CALCIUM SERPL-MCNC: 8.9 MG/DL — SIGNIFICANT CHANGE UP (ref 8.5–10.1)
CHLORIDE SERPL-SCNC: 103 MMOL/L — SIGNIFICANT CHANGE UP (ref 98–110)
CO2 SERPL-SCNC: 26 MMOL/L — SIGNIFICANT CHANGE UP (ref 17–32)
CREAT SERPL-MCNC: 1.1 MG/DL — SIGNIFICANT CHANGE UP (ref 0.7–1.5)
EOSINOPHIL # BLD AUTO: 0.11 K/UL — SIGNIFICANT CHANGE UP (ref 0–0.7)
EOSINOPHIL NFR BLD AUTO: 1.8 % — SIGNIFICANT CHANGE UP (ref 0–8)
GLUCOSE BLDC GLUCOMTR-MCNC: 108 MG/DL — HIGH (ref 70–99)
GLUCOSE BLDC GLUCOMTR-MCNC: 115 MG/DL — HIGH (ref 70–99)
GLUCOSE SERPL-MCNC: 97 MG/DL — SIGNIFICANT CHANGE UP (ref 70–99)
HCT VFR BLD CALC: 26 % — LOW (ref 42–52)
HGB BLD-MCNC: 8.4 G/DL — LOW (ref 14–18)
IMM GRANULOCYTES NFR BLD AUTO: 1 % — HIGH (ref 0.1–0.3)
LYMPHOCYTES # BLD AUTO: 0.95 K/UL — LOW (ref 1.2–3.4)
LYMPHOCYTES # BLD AUTO: 15.5 % — LOW (ref 20.5–51.1)
MCHC RBC-ENTMCNC: 24.3 PG — LOW (ref 27–31)
MCHC RBC-ENTMCNC: 32.3 G/DL — SIGNIFICANT CHANGE UP (ref 32–37)
MCV RBC AUTO: 75.1 FL — LOW (ref 80–94)
MONOCYTES # BLD AUTO: 0.78 K/UL — HIGH (ref 0.1–0.6)
MONOCYTES NFR BLD AUTO: 12.7 % — HIGH (ref 1.7–9.3)
NEUTROPHILS # BLD AUTO: 4.23 K/UL — SIGNIFICANT CHANGE UP (ref 1.4–6.5)
NEUTROPHILS NFR BLD AUTO: 68.8 % — SIGNIFICANT CHANGE UP (ref 42.2–75.2)
NRBC # BLD: 0 /100 WBCS — SIGNIFICANT CHANGE UP (ref 0–0)
PLATELET # BLD AUTO: 248 K/UL — SIGNIFICANT CHANGE UP (ref 130–400)
POTASSIUM SERPL-MCNC: 4.5 MMOL/L — SIGNIFICANT CHANGE UP (ref 3.5–5)
POTASSIUM SERPL-SCNC: 4.5 MMOL/L — SIGNIFICANT CHANGE UP (ref 3.5–5)
RBC # BLD: 3.46 M/UL — LOW (ref 4.7–6.1)
RBC # FLD: 22 % — HIGH (ref 11.5–14.5)
SODIUM SERPL-SCNC: 139 MMOL/L — SIGNIFICANT CHANGE UP (ref 135–146)
WBC # BLD: 6.14 K/UL — SIGNIFICANT CHANGE UP (ref 4.8–10.8)
WBC # FLD AUTO: 6.14 K/UL — SIGNIFICANT CHANGE UP (ref 4.8–10.8)

## 2020-06-14 PROCEDURE — 99239 HOSP IP/OBS DSCHRG MGMT >30: CPT

## 2020-06-14 RX ORDER — CEFTRIAXONE 500 MG/1
1000 INJECTION, POWDER, FOR SOLUTION INTRAMUSCULAR; INTRAVENOUS EVERY 24 HOURS
Refills: 0 | Status: DISCONTINUED | OUTPATIENT
Start: 2020-06-15 | End: 2020-06-14

## 2020-06-14 RX ORDER — PANTOPRAZOLE SODIUM 20 MG/1
1 TABLET, DELAYED RELEASE ORAL
Qty: 0 | Refills: 0 | DISCHARGE
Start: 2020-06-14

## 2020-06-14 RX ORDER — CLONAZEPAM 1 MG
1 TABLET ORAL
Qty: 0 | Refills: 0 | DISCHARGE

## 2020-06-14 RX ORDER — CEFTRIAXONE 500 MG/1
INJECTION, POWDER, FOR SOLUTION INTRAMUSCULAR; INTRAVENOUS
Refills: 0 | Status: DISCONTINUED | OUTPATIENT
Start: 2020-06-14 | End: 2020-06-14

## 2020-06-14 RX ORDER — CEFTRIAXONE 500 MG/1
1000 INJECTION, POWDER, FOR SOLUTION INTRAMUSCULAR; INTRAVENOUS ONCE
Refills: 0 | Status: COMPLETED | OUTPATIENT
Start: 2020-06-14 | End: 2020-06-14

## 2020-06-14 RX ORDER — CEFPODOXIME PROXETIL 100 MG
1 TABLET ORAL
Qty: 10 | Refills: 0
Start: 2020-06-14 | End: 2020-06-18

## 2020-06-14 RX ORDER — ACETAMINOPHEN 500 MG
2 TABLET ORAL
Qty: 0 | Refills: 0 | DISCHARGE
Start: 2020-06-14

## 2020-06-14 RX ORDER — ENOXAPARIN SODIUM 100 MG/ML
1 INJECTION SUBCUTANEOUS
Qty: 14 | Refills: 0
Start: 2020-06-14 | End: 2020-06-27

## 2020-06-14 RX ADMIN — HEPARIN SODIUM 5000 UNIT(S): 5000 INJECTION INTRAVENOUS; SUBCUTANEOUS at 06:49

## 2020-06-14 RX ADMIN — CEFTRIAXONE 100 MILLIGRAM(S): 500 INJECTION, POWDER, FOR SOLUTION INTRAMUSCULAR; INTRAVENOUS at 12:30

## 2020-06-14 RX ADMIN — HEPARIN SODIUM 5000 UNIT(S): 5000 INJECTION INTRAVENOUS; SUBCUTANEOUS at 14:38

## 2020-06-14 RX ADMIN — PANTOPRAZOLE SODIUM 40 MILLIGRAM(S): 20 TABLET, DELAYED RELEASE ORAL at 06:49

## 2020-06-14 RX ADMIN — CITALOPRAM 20 MILLIGRAM(S): 10 TABLET, FILM COATED ORAL at 12:30

## 2020-06-14 RX ADMIN — Medication 50 MICROGRAM(S): at 06:49

## 2020-06-14 NOTE — DISCHARGE NOTE PROVIDER - NSDCCPCAREPLAN_GEN_ALL_CORE_FT
PRINCIPAL DISCHARGE DIAGNOSIS  Diagnosis: Closed fracture of hip, initial encounter  Assessment and Plan of Treatment: NWB RLE  f/u with Ortho Clinic   Tylenol for pain control   s/p ORIF Right Hip - ambulating w/ PT      SECONDARY DISCHARGE DIAGNOSES  Diagnosis: Acute on chronic anemia  Assessment and Plan of Treatment: Acute Component: Due to Surgical Blood Loss  Chronic Component due to other pathology NOS or chronic GIB   Stool occult is positive brown stool   NO MELENA  Likely chronic GIB  f/u for EGD and Colonoscope w/ GI SErvice in the office   Ferritin WNL - may need to r/o other causes of anemia and get hematology referral if GI w/up is negative   Take Protonix 40mg po qd

## 2020-06-14 NOTE — DISCHARGE NOTE PROVIDER - CARE PROVIDER_API CALL
Trevor Diez  ORTHOPAEDIC SURGERY  [address] [address]  Phone: [phone]  Fax: [phone]  Follow Up Time:     Sinan Brantley  Gastroenterology  [address] [place] [id number]  Phone: [phone]  Fax: [phone]  Follow Up Time:

## 2020-06-14 NOTE — DISCHARGE NOTE PROVIDER - CARE PROVIDERS DIRECT ADDRESSES
[email or web address]

## 2020-06-14 NOTE — DISCHARGE NOTE NURSING/CASE MANAGEMENT/SOCIAL WORK - NSDCPELOVENOX_GEN_ALL_CORE
Enoxaparin/Lovenox - Compliance/Enoxaparin/Lovenox - Dietary Advice/Enoxaparin/Lovenox - Potential for adverse drug reactions and interactions/Enoxaparin/Lovenox - Follow up monitoring

## 2020-06-14 NOTE — DISCHARGE NOTE NURSING/CASE MANAGEMENT/SOCIAL WORK - PATIENT PORTAL LINK FT
You can access the FollowMyHealth Patient Portal offered by [facility] by registering at the following website: [email or web address]. By joining [employer]’s FollowMyHealth portal, you will also be able to view your health information using other applications (apps) compatible with our system.

## 2020-06-14 NOTE — DISCHARGE NOTE PROVIDER - NSDCMRMEDTOKEN_GEN_ALL_CORE_FT
acetaminophen 325 mg oral tablet: 2 tab(s) orally every 6 hours, As needed for Pain in Rt Hip   atorvastatin 40 mg oral tablet: 1 tab(s) orally once a day  cefpodoxime 200 mg oral tablet: 1 tab(s) orally 2 times a day   citalopram 20 mg oral tablet: 1 tab(s) orally once a day  donepezil 10 mg oral tablet: 1 tab(s) orally once a day (at bedtime)  lisinopril 5 mg oral tablet: 1 tab(s) orally once a day  Lovenox 30 mg/0.3 mL injectable solution: 1 milligram(s) subcutaneous once a day   pantoprazole 40 mg oral delayed release tablet: 1 tab(s) orally once a day (before a meal)  Synthroid 50 mcg (0.05 mg) oral tablet: 1 tab(s) orally once a day

## 2020-06-14 NOTE — PROGRESS NOTE ADULT - SUBJECTIVE AND OBJECTIVE BOX
ORTHO PROGRESS NOTE     86M POD #5  S/P R hip ORIF with IMN      Patient seen and examined at bedside . No issues overnight. No complaints of chest pain, SOB, N/V.    MEDICATIONS  (STANDING):  atorvastatin Oral Tab/Cap - Peds 40 milliGRAM(s) Oral daily  citalopram 20 milliGRAM(s) Oral daily  clonazePAM  Tablet 0.5 milliGRAM(s) Oral at bedtime  dextrose 10% Bolus 250 milliLiter(s) IV Bolus once  dextrose 5%. 1000 milliLiter(s) (50 mL/Hr) IV Continuous <Continuous>  dextrose 50% Injectable 12.5 Gram(s) IV Push once  dextrose 50% Injectable 25 Gram(s) IV Push once  dextrose 50% Injectable 25 Gram(s) IV Push once  donepezil 10 milliGRAM(s) Oral at bedtime  heparin   Injectable 5000 Unit(s) SubCutaneous every 8 hours  insulin lispro (HumaLOG) corrective regimen sliding scale   SubCutaneous three times a day before meals  insulin regular  human recombinant. 10 Unit(s) SubCutaneous once  levothyroxine 50 MICROGram(s) Oral daily  pantoprazole    Tablet 40 milliGRAM(s) Oral before breakfast    MEDICATIONS  (PRN):  acetaminophen   Tablet .. 650 milliGRAM(s) Oral every 6 hours PRN Temp greater or equal to 38C (100.4F)  dextrose 40% Gel 15 Gram(s) Oral once PRN Blood Glucose LESS THAN 70 milliGRAM(s)/deciliter  glucagon  Injectable 1 milliGRAM(s) IntraMuscular once PRN Glucose LESS THAN 70 milligrams/deciliter  morphine  - Injectable 2 milliGRAM(s) IV Push every 4 hours PRN Moderate Pain (4 - 6)      Physical exam  Vital Signs Last 24 Hrs  T(C): 36.9 (13 Jun 2020 23:59), Max: 38.1 (13 Jun 2020 16:00)  T(F): 98.5 (13 Jun 2020 23:59), Max: 100.6 (13 Jun 2020 16:00)  HR: 106 (13 Jun 2020 23:59) (60 - 106)  BP: 157/75 (13 Jun 2020 23:59) (131/78 - 159/72)  BP(mean): --  RR: 19 (13 Jun 2020 23:59) (19 - 20)  SpO2: --     Dressing C/D/I   Compartments soft and compressible  Motor intact distally  CR<2sec  palpable pulses                           8.1    6.15  )-----------( 200      ( 13 Jun 2020 06:44 )             25.4     06-12    141  |  108  |  24<H>  ----------------------------<  120<H>  4.4   |  24  |  1.2    Ca    8.8      12 Jun 2020 06:57      Assessment/Plan:   86M POD#5 S/P R hip ORIF with IMN, doing well.     OOB to Chair   WBAT RLE   Transfuse as necessary   PT/OT  Pain control   Ext icing/elevation  Incentive Spirometry   DVT Prophylaxis   Discharge planning

## 2020-06-16 LAB
CULTURE RESULTS: SIGNIFICANT CHANGE UP
SPECIMEN SOURCE: SIGNIFICANT CHANGE UP

## 2020-06-17 DIAGNOSIS — F39 UNSPECIFIED MOOD [AFFECTIVE] DISORDER: ICD-10-CM

## 2020-06-17 DIAGNOSIS — D50.9 IRON DEFICIENCY ANEMIA, UNSPECIFIED: ICD-10-CM

## 2020-06-17 DIAGNOSIS — E87.5 HYPERKALEMIA: ICD-10-CM

## 2020-06-17 DIAGNOSIS — I12.9 HYPERTENSIVE CHRONIC KIDNEY DISEASE WITH STAGE 1 THROUGH STAGE 4 CHRONIC KIDNEY DISEASE, OR UNSPECIFIED CHRONIC KIDNEY DISEASE: ICD-10-CM

## 2020-06-17 DIAGNOSIS — R63.6 UNDERWEIGHT: ICD-10-CM

## 2020-06-17 DIAGNOSIS — N18.9 CHRONIC KIDNEY DISEASE, UNSPECIFIED: ICD-10-CM

## 2020-06-17 DIAGNOSIS — G30.9 ALZHEIMER'S DISEASE, UNSPECIFIED: ICD-10-CM

## 2020-06-17 DIAGNOSIS — E78.5 HYPERLIPIDEMIA, UNSPECIFIED: ICD-10-CM

## 2020-06-17 DIAGNOSIS — D62 ACUTE POSTHEMORRHAGIC ANEMIA: ICD-10-CM

## 2020-06-17 DIAGNOSIS — E03.9 HYPOTHYROIDISM, UNSPECIFIED: ICD-10-CM

## 2020-06-17 DIAGNOSIS — E11.22 TYPE 2 DIABETES MELLITUS WITH DIABETIC CHRONIC KIDNEY DISEASE: ICD-10-CM

## 2020-06-17 DIAGNOSIS — S72.141A DISPLACED INTERTROCHANTERIC FRACTURE OF RIGHT FEMUR, INITIAL ENCOUNTER FOR CLOSED FRACTURE: ICD-10-CM
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
## 2020-06-17 DIAGNOSIS — J18.9 PNEUMONIA, UNSPECIFIED ORGANISM: ICD-10-CM

## 2020-06-17 DIAGNOSIS — Z66 DO NOT RESUSCITATE: ICD-10-CM

## 2020-06-17 DIAGNOSIS — E83.42 HYPOMAGNESEMIA: ICD-10-CM

## 2020-06-17 DIAGNOSIS — R50.82 POSTPROCEDURAL FEVER: ICD-10-CM

## 2020-06-17 DIAGNOSIS — F02.80 DEMENTIA IN OTHER DISEASES CLASSIFIED ELSEWHERE, UNSPECIFIED SEVERITY, WITHOUT BEHAVIORAL DISTURBANCE, PSYCHOTIC DISTURBANCE, MOOD DISTURBANCE, AND ANXIETY: ICD-10-CM
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
## 2020-06-18 LAB — GLUCOSE BLDC GLUCOMTR-MCNC: 144 MG/DL — HIGH (ref 70–99)

## 2020-06-19 LAB
CULTURE RESULTS: SIGNIFICANT CHANGE UP
SPECIMEN SOURCE: SIGNIFICANT CHANGE UP

## 2020-07-06 PROBLEM — Z00.00 ENCOUNTER FOR PREVENTIVE HEALTH EXAMINATION: Status: ACTIVE | Noted: 2020-07-06

## 2020-07-13 ENCOUNTER — APPOINTMENT (OUTPATIENT)
Dept: GASTROENTEROLOGY | Facility: CLINIC | Age: 85
End: 2020-07-13

## 2021-08-27 NOTE — BRIEF OPERATIVE NOTE - ESTIMATED BLOOD LOSS
Medication:    Outpatient Medications Marked as Taking for the 8/27/21 encounter (Refill) with Laura Yahr Nelson, MD   Medication Sig Dispense Refill   • methylphenidate (METADATE CD) 20 MG ER (CD) capsule Take 1 capsule by mouth every morning. 30 capsule 0       Message to Prescriber: n/a    [x] Pharmacy has been verified.    [] Patient completely out of medication (*Route encounter as high priority if checked)    [x] Patient informed refill request can take up to 5 business days to be processed    Patient currently has follow up appointment scheduled       200
